# Patient Record
Sex: MALE | Race: OTHER | HISPANIC OR LATINO | Employment: FULL TIME | URBAN - METROPOLITAN AREA
[De-identification: names, ages, dates, MRNs, and addresses within clinical notes are randomized per-mention and may not be internally consistent; named-entity substitution may affect disease eponyms.]

---

## 2019-01-06 ENCOUNTER — HOSPITAL ENCOUNTER (EMERGENCY)
Facility: HOSPITAL | Age: 24
Discharge: HOME | End: 2019-01-06
Attending: EMERGENCY MEDICINE | Admitting: EMERGENCY MEDICINE

## 2019-01-06 VITALS
HEART RATE: 110 BPM | RESPIRATION RATE: 18 BRPM | WEIGHT: 213 LBS | OXYGEN SATURATION: 98 % | SYSTOLIC BLOOD PRESSURE: 140 MMHG | TEMPERATURE: 99.5 F | DIASTOLIC BLOOD PRESSURE: 90 MMHG

## 2019-01-06 DIAGNOSIS — K08.89 TOOTHACHE: Primary | ICD-10-CM

## 2019-01-06 PROCEDURE — 99282 EMERGENCY DEPT VISIT SF MDM: CPT

## 2019-01-06 RX ORDER — IBUPROFEN 600 MG/1
600 TABLET ORAL ONCE
Status: COMPLETED | OUTPATIENT
Start: 2019-01-06 | End: 2019-01-06

## 2019-01-06 RX ORDER — CLINDAMYCIN HYDROCHLORIDE 150 MG/1
300 CAPSULE ORAL EVERY 6 HOURS SCHEDULED
Qty: 80 CAPSULE | Refills: 0 | Status: SHIPPED | OUTPATIENT
Start: 2019-01-06 | End: 2019-01-16

## 2019-01-06 RX ORDER — IBUPROFEN 600 MG/1
600 TABLET ORAL EVERY 6 HOURS PRN
Qty: 30 TABLET | Refills: 0 | Status: SHIPPED | OUTPATIENT
Start: 2019-01-06

## 2019-01-06 RX ORDER — CLINDAMYCIN HYDROCHLORIDE 150 MG/1
300 CAPSULE ORAL ONCE
Status: COMPLETED | OUTPATIENT
Start: 2019-01-06 | End: 2019-01-06

## 2019-01-06 RX ADMIN — IBUPROFEN 600 MG: 600 TABLET ORAL at 20:31

## 2019-01-06 RX ADMIN — CLINDAMYCIN HYDROCHLORIDE 300 MG: 150 CAPSULE ORAL at 20:31

## 2019-01-07 NOTE — DISCHARGE INSTRUCTIONS
Dolor de [employer]   LO QUE NECESITA SABER:   Un dolor de SMITH'S GREEN que es causado por zoran inflamación del nervio en el centro del diente  La irritación puede ser causada por varios problemas, mainor por caries, zoran infección, un diente vencido o enfermedad de las encías  Es muy importante que acuda a ozran yasmine de control con flores odontólogo para que le puedan diagnosticar la causa de flores dolor de SMITH'S GREEN y recibir tratamiento  Lo cual puede ayudar a prevenir problemas serios  [facility]:   Medicamentos:  Es posible que usted necesite alguno de los siguientes:  · AINEs (Analgésicos antiinflamatorios no esteroides)  disminuyen la inflamación y el dolor  Sheree medicamento se puede comprar con o sin receta médica  Sheree medicamento puede causar sangrado estomacal o problemas en los riñones en ciertas personas  Si usted eber un medicamento anticoagulante, asegúrese de preguntarle a flores médico si los VIDA son seguros para usted  Ayesha siempre la etiqueta y siga cuidadosamente las instrucciones antes de usar sheree medicamento  · El acetaminofén  [employer]  Está disponible sin receta médica  Pregunte la cantidad y la frecuencia con que debe tomarlos  [id number]  El acetaminofén puede causar daño en el [employer] cuando no se eber de forma correcta  · Los analgésicos  flores forma de presentación puede ser en píldora o mainor un medicamento que se aplica directamente en el diente o las encías  No espere hasta que el dolor sea severo antes de bebeto sheree medicamento  · Antibióticos  contribuyen a combatir o evitar que el tomer contraiga zoran infección causada por zoran bacteria  Tómelos tony [employer]  · [place] mylene medicamentos mainor se le haya indicado  Consulte con flores médico si usted sarahi que flores medicamento no le está ayudando o si presenta efectos secundarios  Infórmele si es alérgico a algún medicamento   Mantenga zoran lista actualizada de los [place], las vitaminas y los productos herbales que eber  Incluya los siguientes datos de los medicamentos: cantidad, frecuencia y motivo de administración  Traiga con usted la lista o los envases de la píldoras a mylene citas de seguimiento  Lleve la lista de los medicamentos con usted en genesis de zoran emergencia  Programe zoran yasmine de seguimiento con quinones dentista tony mainor se le indica:  Es posible que lo refieran a un odontólogo cirujano  Anote mylene preguntas para que se acuerde de hacerlas duncan mylene visitas  Cuidados personales:   · Enjuague la boca con agua tibia con sal 4 veces al día o según las indicaciones  · Es posible que necesite comer alimentos blandos para aliviar el dolor que le causa masticar  Comuníquese con quinones dentista si:   · Usted tiene preguntas o inquietudes acerca de quinones condición o cuidado  Regrese a la cami de emergencias si:   · Usted tiene dificultad para respirar  · Usted tiene quinones praneeth o jennifer inflamados  · Usted tiene fiebre o escalofríos  · Usted tiene dificultad para hablar o tragar  · Usted tiene dificultad para abrir o cerrar la boca  © 2017 [address] Information is for End User's use only and may not be sold, redistributed or otherwise used for commercial purposes  All illustrations and images included in CareNotes® are the copyrighted property of A D A M , Inc  or Bryan Hassan  Esta información es sólo para uso en educación  Quinones intención no es darle un consejo médico sobre enfermedades o tratamientos  Colsulte con quinones [employer] farmacéutico antes de seguir cualquier régimen médico para saber si es seguro y efectivo para usted

## 2019-01-07 NOTE — ED PROVIDER NOTES
History  Chief Complaint   Patient presents with    Dental Pain     Pin in gums on right side that started a week ago and it got stronger  Denies taking any medicaitons  History provided by:  Patient   used: No    Medical Problem   Location:  Pt with tooth pain for 3 days   Severity:  Mild  Onset quality:  Gradual  Duration:  3 days  Timing:  Constant  Progression:  Unchanged  Chronicity:  New  Associated symptoms: no abdominal pain, no chest pain, no congestion, no cough, no diarrhea, no ear pain, no fatigue, no fever, no headaches, no loss of consciousness, no myalgias, no nausea, no rash, no rhinorrhea, no shortness of breath, no sore throat, no vomiting and no wheezing        None       History reviewed  No pertinent past medical history  History reviewed  No pertinent surgical history  History reviewed  No pertinent family history  I have reviewed and agree with the history as documented  Social History   Substance Use Topics    Smoking status: Never Smoker    Smokeless tobacco: Never Used    Alcohol use Yes      Comment: sometimes        Review of Systems   Constitutional: Negative  Negative for fatigue and fever  HENT: Positive for dental problem  Negative for congestion, ear pain, rhinorrhea and sore throat  Eyes: Negative  Respiratory: Negative  Negative for cough, shortness of breath and wheezing  Cardiovascular: Negative  Negative for chest pain  Gastrointestinal: Negative  Negative for abdominal pain, diarrhea, nausea and vomiting  Endocrine: Negative  Genitourinary: Negative  Musculoskeletal: Negative  Negative for myalgias  Skin: Negative for rash  Allergic/Immunologic: Negative  Neurological: Negative  Negative for loss of consciousness and headaches  Hematological: Negative  Psychiatric/Behavioral: Negative  All other systems reviewed and are negative        Physical Exam  Physical Exam   Constitutional: He is oriented to person, place, and time  He appears well-developed and well-nourished  HENT:   Head: Normocephalic and atraumatic  Right Ear: External ear normal    Left Ear: External ear normal    Nose: Nose normal    Mouth/Throat: Oropharynx is clear and moist    Right upper 2nd molar decay and pain    Eyes: Pupils are equal, round, and reactive to light  Conjunctivae and EOM are normal    Neck: Normal range of motion  Neck supple  Cardiovascular: Normal rate, regular rhythm and normal heart sounds  Pulmonary/Chest: Effort normal and breath sounds normal    Abdominal: Soft  Bowel sounds are normal    Musculoskeletal: Normal range of motion  Neurological: He is alert and oriented to person, place, and time  Skin: Skin is warm and dry  Psychiatric: He has a normal mood and affect  His behavior is normal    Nursing note and vitals reviewed  Vital Signs  ED Triage Vitals [01/06/19 2007]   Temperature Pulse Respirations Blood Pressure SpO2   99 5 °F (37 5 °C) (!) 110 18 140/90 98 %      Temp Source Heart Rate Source Patient Position - Orthostatic VS BP Location FiO2 (%)   Tympanic Monitor Sitting Left arm --      Pain Score       Worst Possible Pain           Vitals:    01/06/19 2007   BP: 140/90   Pulse: (!) 110   Patient Position - Orthostatic VS: Sitting       Visual Acuity      ED Medications  Medications   clindamycin (CLEOCIN) capsule 300 mg (300 mg Oral Given 1/6/19 2031)   ibuprofen (MOTRIN) tablet 600 mg (600 mg Oral Given 1/6/19 2031)       Diagnostic Studies  Results Reviewed     None                 No orders to display              Procedures  Procedures       Phone Contacts  ED Phone Contact    ED Course                               MDM  CritCare Time    Disposition  Final diagnoses:   Toothache     Time reflects when diagnosis was documented in both MDM as applicable and the Disposition within this note     Time User Action Codes Description Comment    1/6/2019  8:23 PM Elzbieta Guan   Add [X98 73] Toothache       ED Disposition     ED Disposition Condition Comment    Discharge  [address] discharge to home/self care  Condition at discharge: Good        Follow-up Information     Follow up With Specialties Details Why [address]  Schedule an appointment as soon as possible for a visit  [address] [place] [address]  [phone]          Discharge Medication List as of 1/6/2019  8:25 PM      START taking these medications    Details   ibuprofen (MOTRIN) 600 mg tablet Take 1 tablet (600 mg total) by mouth every 6 (six) hours as needed (pain), Starting Sun 1/6/2019, Print           No discharge procedures on file      ED Provider  Electronically Signed by           Kassidy Gonzalez PA-C  01/07/19 0126

## 2020-04-07 ENCOUNTER — HOSPITAL ENCOUNTER (EMERGENCY)
Facility: HOSPITAL | Age: 25
Discharge: HOME | End: 2020-04-07
Attending: EMERGENCY MEDICINE | Admitting: EMERGENCY MEDICINE
Payer: COMMERCIAL

## 2020-04-07 ENCOUNTER — APPOINTMENT (EMERGENCY)
Dept: RADIOLOGY | Facility: HOSPITAL | Age: 25
End: 2020-04-07
Payer: COMMERCIAL

## 2020-04-07 VITALS
OXYGEN SATURATION: 98 % | RESPIRATION RATE: 20 BRPM | DIASTOLIC BLOOD PRESSURE: 92 MMHG | WEIGHT: 214.51 LBS | HEART RATE: 115 BPM | TEMPERATURE: 99.8 F | SYSTOLIC BLOOD PRESSURE: 140 MMHG

## 2020-04-07 DIAGNOSIS — R05.9 COUGH: Primary | ICD-10-CM

## 2020-04-07 DIAGNOSIS — Z20.822 SUSPECTED COVID-19 VIRUS INFECTION: ICD-10-CM

## 2020-04-07 PROCEDURE — 71045 X-RAY EXAM CHEST 1 VIEW: CPT

## 2020-04-07 PROCEDURE — 99284 EMERGENCY DEPT VISIT MOD MDM: CPT

## 2020-04-07 PROCEDURE — 87635 SARS-COV-2 COVID-19 AMP PRB: CPT | Performed by: EMERGENCY MEDICINE

## 2020-04-07 PROCEDURE — 99284 EMERGENCY DEPT VISIT MOD MDM: CPT | Performed by: EMERGENCY MEDICINE

## 2020-04-07 RX ORDER — ACETAMINOPHEN 325 MG/1
650 TABLET ORAL ONCE
Status: COMPLETED | OUTPATIENT
Start: 2020-04-07 | End: 2020-04-07

## 2020-04-07 RX ADMIN — ACETAMINOPHEN 650 MG: 325 TABLET ORAL at 12:35

## 2020-04-08 ENCOUNTER — TELEPHONE (OUTPATIENT)
Dept: EMERGENCY DEPT | Facility: HOSPITAL | Age: 25
End: 2020-04-08

## 2020-04-08 LAB — SARS-COV-2 RNA SPEC QL NAA+PROBE: DETECTED

## 2021-05-18 ENCOUNTER — APPOINTMENT (OUTPATIENT)
Dept: RADIOLOGY | Age: 26
End: 2021-05-18
Attending: NURSE PRACTITIONER
Payer: OTHER MISCELLANEOUS

## 2021-05-18 ENCOUNTER — OCCMED (OUTPATIENT)
Dept: URGENT CARE | Age: 26
End: 2021-05-18
Payer: OTHER MISCELLANEOUS

## 2021-05-18 DIAGNOSIS — T14.90XA INJURY: ICD-10-CM

## 2021-05-18 DIAGNOSIS — T14.90XA INJURY: Primary | ICD-10-CM

## 2021-05-18 PROCEDURE — 73110 X-RAY EXAM OF WRIST: CPT

## 2021-05-18 PROCEDURE — 99284 EMERGENCY DEPT VISIT MOD MDM: CPT | Performed by: NURSE PRACTITIONER

## 2021-05-18 PROCEDURE — G0383 LEV 4 HOSP TYPE B ED VISIT: HCPCS | Performed by: NURSE PRACTITIONER

## 2021-05-21 ENCOUNTER — APPOINTMENT (OUTPATIENT)
Dept: URGENT CARE | Age: 26
End: 2021-05-21
Payer: OTHER MISCELLANEOUS

## 2021-05-21 PROCEDURE — 99213 OFFICE O/P EST LOW 20 MIN: CPT | Performed by: PHYSICIAN ASSISTANT

## 2021-05-28 ENCOUNTER — APPOINTMENT (OUTPATIENT)
Dept: URGENT CARE | Age: 26
End: 2021-05-28
Payer: OTHER MISCELLANEOUS

## 2021-05-28 PROCEDURE — 99213 OFFICE O/P EST LOW 20 MIN: CPT | Performed by: PHYSICIAN ASSISTANT

## 2021-06-07 ENCOUNTER — EVALUATION (OUTPATIENT)
Dept: OCCUPATIONAL THERAPY | Age: 26
End: 2021-06-07
Payer: OTHER MISCELLANEOUS

## 2021-06-07 DIAGNOSIS — S63.501D SPRAIN OF WRIST, RIGHT, SUBSEQUENT ENCOUNTER: Primary | ICD-10-CM

## 2021-06-07 PROCEDURE — 97140 MANUAL THERAPY 1/> REGIONS: CPT

## 2021-06-07 PROCEDURE — 97165 OT EVAL LOW COMPLEX 30 MIN: CPT

## 2021-06-07 PROCEDURE — 97535 SELF CARE MNGMENT TRAINING: CPT

## 2021-06-07 NOTE — PROGRESS NOTES
OT Evaluation     Today's date: 2021  Patient name: Zander Joseph  : 1995  MRN: [id number]  Referring provider: Symone Guevara MD  Dx:   Encounter Diagnosis     ICD-10-CM    1  Sprain of wrist, right, subsequent encounter  S63 501D                   Assessment  Assessment details: Mary Ann [id number]; used for evaluation  Pt is a 29 y/o male who reports injuring his R hand/forearm at work when a heavy crate of water fell on him and his wrist bent backwards on 21  Pt x-rays negative for fracture, but pt still reporting pain and swelling in R wrist and forearm  Pt referred to workmens comp MD who referred pt to skilled OT  Pt presented with OTC wrist brace support on R hand which he reports wearing majority of the time since 21 or else he feels pain  Pt demo mild edema, inflammation and tenderness to 2nd DC, ECR L and extensors at distal forearm  Therapist educated pt on edema mgmt of use of KT and compression sleeve, as well as continued icing to reduce edema  Therapist educated pt that he may benefit from better wrist cock up splint to completely immobilize hand, as the one he has allows some movement  Pt paperwork reports he is on limited duty but pt reports he is doing the same sorts of work and machinery use that causes pain, which may be a reason contributing to continued issues  Therapist edu pt to speak with MD about work note on limitations/restrictions  Pt would benefit from continued skilled OT services to provide pt with support his wrist needs, edema and anti-inflammtory techniques in therapy, dec pain, inc ROM, and inc pt ability to return to work full duty without pain     Impairments: abnormal coordination, abnormal or restricted ROM, activity intolerance, impaired physical strength, lacks appropriate home exercise program and pain with function  Other impairment: dec wrist extension, moderate pain while working, mild edema  Functional limitations: unable to complete full work load without pain  Symptom irritability: lowBarriers to therapy: Icelandic speaking  Understanding of Dx/Px/POC: good   Prognosis: good    Goals  STGs:  1  Pt will inc wrist ext + 5 degrees  2  Pt will demo dec edema to wrist by - 3 cm  3  Pt will report dec pain to wrist by 50%  LTGs:  1  Pt will inc wrist AROM to WFLs  2  Pt wi;; inc R  strength to WFLs  3  Pt will be independent with work tasks; return to full duty  4  Pt will demo no edema to R wrist/forearm  Plan  Patient would benefit from: custom splinting and skilled occupational therapy  Planned modality interventions: contrast bath immersion, cryotherapy, thermotherapy: hydrocollator packs and ultrasound  Other planned modality interventions: [facility]  Planned therapy interventions: manual therapy, massage, compression, strengthening, stretching, therapeutic activities, therapeutic exercise, functional ROM exercises and home exercise program  Frequency: 2x week  Duration in weeks: 4  Plan of Care beginning date: 2021  Plan of Care expiration date: 2021  Treatment plan discussed with: patient        Subjective Evaluation    History of Present Illness  Date of onset: 2021  Mechanism of injury: trauma  Mechanism of injury: Pt is a 29 y/o male who reports injuring his R hand/forearm at work when a heavy crate of water fell on him and his wrist bent backwards on 21  Pt x-rays negative for fracture, but pt still reporting pain and swelling in R wrist and forearm             Not a recurrent problem   Quality of life: poor    Pain  Current pain ratin  At best pain ratin  At worst pain ratin  Location: from hand up to elbow  Quality: throbbing, radiating and cramping  Relieving factors: rest, support and ice  Progression: no change    Hand dominance: right    Treatments  Previous treatment: immobilization  Current treatment: occupational therapy  Patient Goals  Patient goals for therapy: decreased pain, return to work, increased strength and decreased edema          Objective     Observations     Right Wrist/Hand   Positive for edema  Additional Observation Details  Mild edema evident at wrist and hand; with inflammation at distal forearm    Palpation     Right   Tenderness of the extensor carpi radialis brevis, extensor carpi radialis longus, extensor digitorum profundus and flexor digitorum superficialis  Tenderness     Right Wrist/Hand   Tenderness in the second dorsal compartment  Neurological Testing     Sensation     Wrist/Hand     Right   Intact: light touch, pin prick and sharp/dull discrimination    Active Range of Motion     Left Wrist   Normal active range of motion    Right Wrist   Wrist flexion: 55 degrees   Wrist extension: 45 degrees with pain  Radial deviation: 20 degrees   Ulnar deviation: 10 degrees with pain    Additional Active Range of Motion Details  WFLs    Strength/Myotome Testing     Left Wrist/Hand   Wrist extension: 4  Wrist flexion: 4  Radial deviation: 4  Ulnar deviation: 4    Right Wrist/Hand   Wrist extension: 3  Wrist flexion: 3  Radial deviation: 3  Ulnar deviation: 3    Additional Strength Details  Weakness noted for wrist ext and hand     Tests     Right Elbow   Negative Tinel's sign (cubital tunnel)  Right Wrist/Hand   Negative Phalen's sign and Tinel's sign (medial nerve)       Swelling     Left Wrist/Hand   Circumference wrist: 17 5 cm    Right Wrist/Hand   Circumference wrist: 18 5 cm             Precautions: Universal; 3x week for 3 weeks- modalities as indicated      Manuals             STM             IASTM             Edema mgmt compression sleeve and KT                         Neuro Re-Ed                                                                                                        Ther Ex             Wrist PREs             Wrist strengthening             forearm strengthening              strength                                                                 Ther Activity                                       Gait Training                                       Modalities             MP/CP

## 2021-06-10 ENCOUNTER — APPOINTMENT (OUTPATIENT)
Dept: URGENT CARE | Age: 26
End: 2021-06-10
Payer: OTHER MISCELLANEOUS

## 2021-06-10 ENCOUNTER — OFFICE VISIT (OUTPATIENT)
Dept: OCCUPATIONAL THERAPY | Age: 26
End: 2021-06-10
Payer: OTHER MISCELLANEOUS

## 2021-06-10 DIAGNOSIS — S63.501D SPRAIN OF WRIST, RIGHT, SUBSEQUENT ENCOUNTER: Primary | ICD-10-CM

## 2021-06-10 PROCEDURE — 99213 OFFICE O/P EST LOW 20 MIN: CPT | Performed by: PREVENTIVE MEDICINE

## 2021-06-10 PROCEDURE — 97760 ORTHOTIC MGMT&TRAING 1ST ENC: CPT

## 2021-06-10 PROCEDURE — 97035 APP MDLTY 1+ULTRASOUND EA 15: CPT

## 2021-06-10 PROCEDURE — 97140 MANUAL THERAPY 1/> REGIONS: CPT

## 2021-06-10 NOTE — PROGRESS NOTES
Daily Note     Today's date: 6/10/2021  Patient name: Evens Lopez  : 1995  MRN: [id number]  Referring provider: Alfonso Leon MD  Dx:   Encounter Diagnosis     ICD-10-CM    1  Sprain of wrist, right, subsequent encounter  S69 327H                 Cryacome  used  Subjective: "I still get pain, when I am working and trying to grab or lift items "      Objective: See treatment diary below      Assessment: Tolerated treatment well  Therapist focused tx on swelling/inflammation of dorsal wrist with use of U/S and STM  Pt reported pain with passive stretch and resistance in extension and radial and ulnar deviation  Therapist fabricated wrist cock up splint to provide more support and less mobility to give pt a rest  Pt has MD appt this afternoon  Patient demonstrated fatigue post treatment and would benefit from continued OT      Plan: Continue per plan of care        Precautions: Universal; 3x week for 3 weeks- modalities as indicated      Manuals  6/10           STM  5'           IASTM             Edema mgmt compression sleeve and KT            splitn klaudia  Ortho fit perf           Neuro Re-Ed                                                                                                        Ther Ex             Wrist PREs  10x           Wrist strengthening             forearm strengthening              strength                                                                 Ther Activity                                       Gait Training                                       Modalities             MP/CP             U/S  8'

## 2021-06-17 ENCOUNTER — OFFICE VISIT (OUTPATIENT)
Dept: OCCUPATIONAL THERAPY | Age: 26
End: 2021-06-17
Payer: OTHER MISCELLANEOUS

## 2021-06-17 DIAGNOSIS — S63.501D SPRAIN OF WRIST, RIGHT, SUBSEQUENT ENCOUNTER: Primary | ICD-10-CM

## 2021-06-17 PROCEDURE — 97140 MANUAL THERAPY 1/> REGIONS: CPT

## 2021-06-17 PROCEDURE — 97035 APP MDLTY 1+ULTRASOUND EA 15: CPT

## 2021-06-17 PROCEDURE — 97110 THERAPEUTIC EXERCISES: CPT

## 2021-06-17 NOTE — PROGRESS NOTES
Daily Note     Today's date: 2021  Patient name: Rubens Torres  : 1995  MRN: [id number]  Referring provider: Renetta Bey MD  Dx:   Encounter Diagnosis     ICD-10-CM    1  Sprain of wrist, right, subsequent encounter  S60 501D                   Subjective: 'My pain is less now that I have some more work restrictions and the hard brace "      Objective: See treatment diary below      Assessment: Tolerated treatment well  Pt demo mild edema/tenosynovitis at dorsum of distal wrist  Therapist utilized U/S and edema massage to dec swelling and inflammation  Pt educated on self stretched, instructed to not over do it, stick to 10x as pt reported inc pain the more he did  Pt also edu and provided HEP for isometric contractions to start strengthening, to tolerance, not to go past point of pain  Pt still instructed rest, ice, and tubi  for edema mgmt  Patient demonstrated fatigue post treatment and would benefit from continued OT      Plan: Continue per plan of care        Precautions: Universal; 3x week for 3 weeks- modalities as indicated      Manuals  6/10 6/17          STM  5' 5' edu to perf at home          IASTM             Edema mgmt compression sleeve and KT  3'          splitn klaudia  Ortho fit perf           Neuro Re-Ed                                                                                                        Ther Ex             Wrist PREs  10x Flex/ext stretch 5-10x hold 10 sec; prayer stretch          Wrist strengthening   isometric strengthening flex/ex and UD/RD 15 sec hold 5x          forearm strengthening              strength                                                                 Ther Activity                                       Gait Training                                       Modalities             MP/CP             U/S  8' 8'

## 2021-06-21 ENCOUNTER — OFFICE VISIT (OUTPATIENT)
Dept: OCCUPATIONAL THERAPY | Age: 26
End: 2021-06-21
Payer: OTHER MISCELLANEOUS

## 2021-06-21 DIAGNOSIS — S63.501D SPRAIN OF WRIST, RIGHT, SUBSEQUENT ENCOUNTER: Primary | ICD-10-CM

## 2021-06-21 PROCEDURE — 97035 APP MDLTY 1+ULTRASOUND EA 15: CPT

## 2021-06-21 PROCEDURE — 97140 MANUAL THERAPY 1/> REGIONS: CPT

## 2021-06-21 PROCEDURE — 97110 THERAPEUTIC EXERCISES: CPT

## 2021-06-21 NOTE — PROGRESS NOTES
Daily Note     Today's date: 2021  Patient name: Micaela López  : 1995  MRN: [id number]  Referring provider: Olvin Ya MD  Dx:   Encounter Diagnosis     ICD-10-CM    1  Sprain of wrist, right, subsequent encounter  S63 501D                   Subjective: "I am feeling better, work is going well with the limitations, the exercises I did over the weekend were fine "      Objective: See treatment diary below      Assessment: Tolerated treatment well  Pt demo mild/mod edema to distal doral forearm  Therapist utilized U/S and STM to dec edema and dec tenosynovitis  Pt reported mild tingling and hands after IASTM use  Pt reported no pain with flexor and extensor stretches  Pt demo G carryover of isometric contraction exercises, without going past point of pain  Therapist applied KT to dec edema  After tx, his swelling noted dec  Patient exhibited good technique with therapeutic exercises and would benefit from continued OT      Plan: Continue per plan of care    dec pain, dec swelling/inflammation     Precautions: Universal; 3x week for 3 weeks- modalities as indicated      Manuals  6/10 6/17 6/21         STM  5' 5' edu to perf at home 5'         IASTM    5'         Edema mgmt compression sleeve and KT  3' compresison and KT         splitn klaudia  Ortho fit perf           Neuro Re-Ed                                                                                                        Ther Ex             Wrist PREs  10x Flex/ext stretch 5-10x hold 10 sec; prayer stretch Flex/ext stretch 5-10x hold 10 sec; prayer stretch         Wrist strengthening   isometric strengthening flex/ex and UD/RD 15 sec hold 5x isometric strengthening flex/ex and UD/RD 5 sec hold 5x            forearm strengthening              strength                                                                 Ther Activity                                       Gait Training                                       Modalities MP/CP             U/S  8' 8' 8'

## 2021-06-22 ENCOUNTER — APPOINTMENT (OUTPATIENT)
Dept: URGENT CARE | Age: 26
End: 2021-06-22
Payer: OTHER MISCELLANEOUS

## 2021-06-22 PROCEDURE — 99213 OFFICE O/P EST LOW 20 MIN: CPT | Performed by: PREVENTIVE MEDICINE

## 2021-06-24 ENCOUNTER — OFFICE VISIT (OUTPATIENT)
Dept: OCCUPATIONAL THERAPY | Age: 26
End: 2021-06-24
Payer: OTHER MISCELLANEOUS

## 2021-06-24 DIAGNOSIS — S63.501D SPRAIN OF WRIST, RIGHT, SUBSEQUENT ENCOUNTER: Primary | ICD-10-CM

## 2021-06-24 PROCEDURE — 97140 MANUAL THERAPY 1/> REGIONS: CPT

## 2021-06-24 PROCEDURE — 97110 THERAPEUTIC EXERCISES: CPT

## 2021-06-24 NOTE — PROGRESS NOTES
Daily Note     Today's date: 2021  Patient name: Sherin Barfield  : 1995  MRN: [id number]  Referring provider: Katiana Stubbs MD  Dx:   Encounter Diagnosis     ICD-10-CM    1  Sprain of wrist, right, subsequent encounter  S65 501D                   Subjective: "I only have pain when I am using it a lot or pushing/pulling things  I noticed my arm has gone numb when I have been driving "      Objective: See treatment diary below      Assessment: Tolerated treatment well  Pt noted with mild/mod tenderness and edema at distal forearm/ tenosynivitis  Therapist perf STM and edu pt on performing at home  Therapist noticed pt OTC wrist brace too small and putting extra pressure on the exact spot of injury  Pt instructed to bring in brace that was made as he says it feels a little big as well as bigger OTC brace for him to wear at work  Patient exhibited good technique with therapeutic exercises and would benefit from continued OT      Plan: Continue per plan of care        Precautions: Universal; 3x week for 3 weeks- modalities as indicated      Manuals  6/10 6/17 6/21  6/24       STM  5' 5' edu to perf at home 5'  5'       IASTM    5'  5'       Edema mgmt compression sleeve and KT  3' compresison and KT  KT       splitn klaudia  Ortho fit perf           Neuro Re-Ed                                                                                                        Ther Ex             Wrist PREs  10x Flex/ext stretch 5-10x hold 10 sec; prayer stretch Flex/ext stretch 5-10x hold 10 sec; prayer stretch  wrist maze, f/e stretch, flex bar       Wrist strengthening   isometric strengthening flex/ex and UD/RD 15 sec hold 5x isometric strengthening flex/ex and UD/RD 5 sec hold 5x     eccentrix 2#       forearm strengthening      YH 2x10        strength      Red power web                                                           Ther Activity                                       Gait Training Modalities             MP/CP             U/S  8' 8' 8'

## 2021-06-29 ENCOUNTER — APPOINTMENT (OUTPATIENT)
Dept: URGENT CARE | Age: 26
End: 2021-06-29
Payer: OTHER MISCELLANEOUS

## 2021-06-29 PROCEDURE — 99213 OFFICE O/P EST LOW 20 MIN: CPT | Performed by: PREVENTIVE MEDICINE

## 2025-04-15 ENCOUNTER — OFFICE VISIT (OUTPATIENT)
Dept: FAMILY MEDICINE CLINIC | Facility: CLINIC | Age: 30
End: 2025-04-15
Payer: COMMERCIAL

## 2025-04-15 VITALS
RESPIRATION RATE: 16 BRPM | TEMPERATURE: 98.3 F | OXYGEN SATURATION: 98 % | BODY MASS INDEX: 27.99 KG/M2 | HEIGHT: 69 IN | SYSTOLIC BLOOD PRESSURE: 112 MMHG | HEART RATE: 86 BPM | WEIGHT: 189 LBS | DIASTOLIC BLOOD PRESSURE: 70 MMHG

## 2025-04-15 DIAGNOSIS — M54.50 CHRONIC LEFT-SIDED LOW BACK PAIN WITHOUT SCIATICA: ICD-10-CM

## 2025-04-15 DIAGNOSIS — Z13.6 SCREENING FOR CARDIOVASCULAR CONDITION: ICD-10-CM

## 2025-04-15 DIAGNOSIS — Z11.4 SCREENING FOR HIV (HUMAN IMMUNODEFICIENCY VIRUS): ICD-10-CM

## 2025-04-15 DIAGNOSIS — E66.3 OVERWEIGHT (BMI 25.0-29.9): ICD-10-CM

## 2025-04-15 DIAGNOSIS — G89.29 CHRONIC LEFT-SIDED LOW BACK PAIN WITHOUT SCIATICA: ICD-10-CM

## 2025-04-15 DIAGNOSIS — R35.0 URINARY FREQUENCY: ICD-10-CM

## 2025-04-15 DIAGNOSIS — Z11.59 NEED FOR HEPATITIS C SCREENING TEST: ICD-10-CM

## 2025-04-15 DIAGNOSIS — Z00.00 ANNUAL PHYSICAL EXAM: ICD-10-CM

## 2025-04-15 DIAGNOSIS — Z76.89 ENCOUNTER TO ESTABLISH CARE: Primary | ICD-10-CM

## 2025-04-15 PROCEDURE — 99385 PREV VISIT NEW AGE 18-39: CPT | Performed by: PHYSICIAN ASSISTANT

## 2025-04-15 PROCEDURE — 99204 OFFICE O/P NEW MOD 45 MIN: CPT | Performed by: PHYSICIAN ASSISTANT

## 2025-04-15 RX ORDER — ACETAMINOPHEN 325 MG/1
650 TABLET ORAL EVERY 6 HOURS PRN
COMMUNITY
End: 2025-04-15 | Stop reason: ALTCHOICE

## 2025-04-15 NOTE — PROGRESS NOTES
Adult Annual Physical  Name: Jemal Diaz      : 1995      MRN: [id number]  Encounter Provider: Jane Lynn PA-C  Encounter Date: 4/15/2025   Encounter department: [facility]    :  Assessment & Plan  Encounter to establish care  No previous PCP, establish care as a new patient       Annual physical exam         Urinary frequency  Drinks coffee twice a day and soda with some meals. Recommend against caffeine beverages due to recent increased frequency of urination sometimes every 20-30 min and waking up at night, strong urinary stream. Same sexual partner for past 10 years, no concerns for STI. Tries to stay hydrated, gets thirsty after drinking coffee. Check UA and will consider PVR pending response to stopping caffeine.   Orders:  •  UA w Reflex to Microscopic w Reflex to Culture; Future  •  Chlamydia/GC amplified DNA by PCR; Future    Chronic left-sided low back pain without sciatica  Intermittent, no current pain on exam.  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  Intermittent heavy lifting.  Recommend core strengthening and stretching exercises.  Not requiring any medication, continue Tylenol as needed.  Follow-up if no improvement or worsening.       Screening for HIV (human immunodeficiency virus)    Orders:  •  HIV 1/2 AG/AB w Reflex [facility] for 2 yr old and above; Future    Need for hepatitis C screening test    Orders:  •  Hepatitis C antibody; Future    Screening for cardiovascular condition    Orders:  •  Lipid panel; Future  •  Comprehensive metabolic panel; Future  •  CBC and differential; Future    Overweight (BMI 25.0-29.9)  BMI Counseling: Body mass index is 28.32 kg/m². The BMI     Lost about 10 pounds intentionally.  Max weight 200 pounds.  Encouraged to maintain diet and exercise.               Preventive Screenings:  - Diabetes Screening: orders placed  - Cholesterol Screening: orders placed   - Hepatitis C screening: patient agrees to  screening   - HIV screening: patient agrees to screening   - Colon cancer screening: screening not indicated   - Lung cancer screening: screening not indicated   - Prostate cancer screening: screening not indicated     Immunizations:  - Immunizations due: Influenza, Tdap and tdap discussed - not in stock  - Risks/benefits immunizations discussed      Counseling/Anticipatory Guidance:  - Alcohol: discussed moderation in alcohol intake and recommendations for healthy alcohol use.   - Drug use: discussed harms of illicit drug use and how it can negatively impact mental/physical health.   - Tobacco use: discussed harms of tobacco use and management options for quitting.   - Dental health: discussed importance of regular tooth brushing, flossing, and dental visits.   - Sexual health: discussed sexually transmitted diseases, partner selection, use of condoms, avoidance of unintended pregnancy, and contraceptive alternatives.   - Diet: discussed recommendations for a healthy/well-balanced diet.   - Exercise: the importance of regular exercise/physical activity was discussed. Recommend exercise 3-5 times per week for at least 30 minutes.   - Injury prevention: discussed safety/seat belts, safety helmets, smoke detectors, carbon monoxide detectors, and smoking near bedding or upholstery.       Depression Screening and Follow-up Plan: Patient was screened for depression during today's encounter. They screened negative with a PHQ-2 score of 0.          History of Present Illness     Adult Annual Physical:  Patient presents for annual physical. Jemal is a 29 y.o. male who presents as a new patient to establish care.  No previous PCP, concerned due to increased urinary frequency in the past year.  No incontinence, hematuria, slowed stream.  No burning or urgency.  Increased frequency, going at least once every hour, drinking coffee twice a day.  Most days will have soda with his meals.  Former smoker, quit when he was  "hospitalized about 10 years ago for pneumonia.  Alcohol about once weekly or less.    History was conducted in Romansh without the use of ,  was offered to patient and was declined by patient.     Toddler aged daughter present for exam.     Diet and Physical Activity:  - Diet/Nutrition: no special diet.  - Exercise: no formal exercise.    Depression Screening:  - PHQ-2 Score: 0    General Health:  - Sleep: sleeps well.  - Hearing: normal hearing bilateral ears.  - Vision: wears glasses.  - Dental: no dental visits for > 1 year.     Health:  - History of STDs: no.   - Urinary symptoms: urinary frequency.     Advanced Care Planning:  - Has an advanced directive?: no    - Has a durable medical POA?: no    - ACP document given to patient?: no      Review of Systems   Constitutional:  Negative for chills and fever.   HENT:  Negative for ear pain and sore throat.    Eyes:  Negative for pain and visual disturbance.   Respiratory:  Negative for cough and shortness of breath.    Cardiovascular:  Negative for chest pain and palpitations.   Gastrointestinal:  Negative for abdominal pain and vomiting.   Genitourinary:  Positive for frequency. Negative for decreased urine volume, dysuria, flank pain and hematuria.   Musculoskeletal:  Positive for back pain. Negative for arthralgias.   Skin:  Negative for color change and rash.   Neurological:  Negative for seizures and syncope.   All other systems reviewed and are negative.        Objective   /70 (BP Location: Left arm, Patient Position: Sitting, Cuff Size: Large)   Pulse 86   Temp 98.3 °F (36.8 °C) (Temporal)   Resp 16   Ht 5' 8.5\" (1.74 m)   Wt 85.7 kg (189 lb)   SpO2 98%   BMI 28.32 kg/m²     Physical Exam  Vitals and nursing note reviewed.   Constitutional:       General: He is not in acute distress.     Appearance: He is well-developed.   HENT:      Head: Normocephalic and atraumatic.      Right Ear: Tympanic membrane, ear canal " and external ear normal.      Left Ear: Tympanic membrane, ear canal and external ear normal.      Mouth/Throat:      Mouth: Mucous membranes are moist.   Eyes:      Extraocular Movements: Extraocular movements intact.      Conjunctiva/sclera: Conjunctivae normal.      Pupils: Pupils are equal, round, and reactive to light.   Cardiovascular:      Rate and Rhythm: Normal rate and regular rhythm.      Heart sounds: No murmur heard.  Pulmonary:      Effort: Pulmonary effort is normal. No respiratory distress.      Breath sounds: Normal breath sounds.   Abdominal:      Palpations: Abdomen is soft.      Tenderness: There is no abdominal tenderness.   Musculoskeletal:         General: No swelling.      Cervical back: Neck supple.   Skin:     General: Skin is warm and dry.      Capillary Refill: Capillary refill takes less than 2 seconds.   Neurological:      Mental Status: He is alert.   Psychiatric:         Mood and Affect: Mood normal.

## 2025-04-15 NOTE — ASSESSMENT & PLAN NOTE
BMI Counseling: Body mass index is 28.32 kg/m². The BMI     Lost about 10 pounds intentionally.  Max weight 200 pounds.  Encouraged to maintain diet and exercise.

## 2025-05-06 ENCOUNTER — RESULTS FOLLOW-UP (OUTPATIENT)
Dept: FAMILY MEDICINE CLINIC | Facility: CLINIC | Age: 30
End: 2025-05-06

## 2025-05-06 ENCOUNTER — APPOINTMENT (OUTPATIENT)
Dept: LAB | Facility: HOSPITAL | Age: 30
End: 2025-05-06
Payer: COMMERCIAL

## 2025-05-13 ENCOUNTER — OFFICE VISIT (OUTPATIENT)
Dept: FAMILY MEDICINE CLINIC | Facility: CLINIC | Age: 30
End: 2025-05-13
Payer: COMMERCIAL

## 2025-05-13 ENCOUNTER — RESULTS FOLLOW-UP (OUTPATIENT)
Dept: FAMILY MEDICINE CLINIC | Facility: CLINIC | Age: 30
End: 2025-05-13

## 2025-05-13 VITALS
RESPIRATION RATE: 17 BRPM | BODY MASS INDEX: 28.23 KG/M2 | SYSTOLIC BLOOD PRESSURE: 124 MMHG | OXYGEN SATURATION: 98 % | HEIGHT: 69 IN | HEART RATE: 85 BPM | TEMPERATURE: 96.6 F | DIASTOLIC BLOOD PRESSURE: 74 MMHG | WEIGHT: 190.6 LBS

## 2025-05-13 DIAGNOSIS — E66.3 OVERWEIGHT (BMI 25.0-29.9): ICD-10-CM

## 2025-05-13 DIAGNOSIS — E78.1 HYPERTRIGLYCERIDEMIA: ICD-10-CM

## 2025-05-13 DIAGNOSIS — E11.65 TYPE 2 DIABETES MELLITUS WITH HYPERGLYCEMIA, WITHOUT LONG-TERM CURRENT USE OF INSULIN (HCC): Primary | ICD-10-CM

## 2025-05-13 DIAGNOSIS — R81 GLUCOSURIA: ICD-10-CM

## 2025-05-13 LAB
CREAT UR-MCNC: 42 MG/DL
LEFT EYE DIABETIC RETINOPATHY: NORMAL
LEFT EYE IMAGE QUALITY: NORMAL
LEFT EYE MACULAR EDEMA: NORMAL
LEFT EYE OTHER RETINOPATHY: NORMAL
MICROALBUMIN UR-MCNC: <7 MG/L
RIGHT EYE DIABETIC RETINOPATHY: NORMAL
RIGHT EYE IMAGE QUALITY: NORMAL
RIGHT EYE MACULAR EDEMA: NORMAL
RIGHT EYE OTHER RETINOPATHY: NORMAL
SEVERITY (EYE EXAM): NORMAL
SL AMB POCT HEMOGLOBIN AIC: 12.9 (ref ?–6.5)

## 2025-05-13 PROCEDURE — 83036 HEMOGLOBIN GLYCOSYLATED A1C: CPT | Performed by: PHYSICIAN ASSISTANT

## 2025-05-13 PROCEDURE — 82043 UR ALBUMIN QUANTITATIVE: CPT | Performed by: PHYSICIAN ASSISTANT

## 2025-05-13 PROCEDURE — 99214 OFFICE O/P EST MOD 30 MIN: CPT | Performed by: PHYSICIAN ASSISTANT

## 2025-05-13 PROCEDURE — 82570 ASSAY OF URINE CREATININE: CPT | Performed by: PHYSICIAN ASSISTANT

## 2025-05-13 RX ORDER — METFORMIN HYDROCHLORIDE 500 MG/1
500 TABLET, EXTENDED RELEASE ORAL 2 TIMES DAILY WITH MEALS
Qty: 180 TABLET | Refills: 1 | Status: SHIPPED | OUTPATIENT
Start: 2025-05-13

## 2025-05-13 RX ORDER — LANCETS 33 GAUGE
EACH MISCELLANEOUS
Qty: 100 EACH | Refills: 3 | Status: SHIPPED | OUTPATIENT
Start: 2025-05-13

## 2025-05-13 RX ORDER — BLOOD SUGAR DIAGNOSTIC
STRIP MISCELLANEOUS
Qty: 100 EACH | Refills: 3 | Status: SHIPPED | OUTPATIENT
Start: 2025-05-13

## 2025-05-13 RX ORDER — BLOOD-GLUCOSE METER
KIT MISCELLANEOUS
Qty: 1 KIT | Refills: 0 | Status: SHIPPED | OUTPATIENT
Start: 2025-05-13

## 2025-05-13 RX ORDER — ATORVASTATIN CALCIUM 10 MG/1
10 TABLET, FILM COATED ORAL DAILY
Qty: 90 TABLET | Refills: 1 | Status: SHIPPED | OUTPATIENT
Start: 2025-05-13

## 2025-05-13 RX ORDER — GLIMEPIRIDE 2 MG/1
2 TABLET ORAL
Qty: 30 TABLET | Refills: 0 | Status: SHIPPED | OUTPATIENT
Start: 2025-05-13

## 2025-05-13 RX ORDER — ATORVASTATIN CALCIUM 40 MG/1
40 TABLET, FILM COATED ORAL DAILY
Status: CANCELLED | OUTPATIENT
Start: 2025-05-13

## 2025-05-13 NOTE — ASSESSMENT & PLAN NOTE
Lab Results   Component Value Date    CHOLESTEROL 125 05/06/2025     Lab Results   Component Value Date    HDL 33 (L) 05/06/2025     Lab Results   Component Value Date    TRIG 211 (H) 05/06/2025     Lab Results   Component Value Date    NONHDLC 92 05/06/2025     Start atorvastatin 10mg with goal to maintain LDL <70 for diabetes.    Lab Results   Component Value Date    LDLCALC 50 05/06/2025

## 2025-05-13 NOTE — ASSESSMENT & PLAN NOTE
Wt Readings from Last 3 Encounters:   05/13/25 86.5 kg (190 lb 9.6 oz)   04/15/25 85.7 kg (189 lb)   04/07/20 97.3 kg (214 lb 8.1 oz)     Stable weight since last visit. Encouraged well-balanced diet and exercise.

## 2025-05-13 NOTE — ASSESSMENT & PLAN NOTE
Lab Results   Component Value Date    HGBA1C 12.9 (A) 05/13/2025     New diagnosis today, symptoms ongoing for about 2-3 years but never sought medical attention, suspect cause of frequent urination and weight loss. Significant hyperglycemia with fatigue. Demonstrated use of glucometer. Completed IRIS exam and diabetic foot exam, urine sent for annual microalbumin/cr ratio. Discussed risks of hyperglycemia and importance of glucose management. Discussed insulin today, will hold pending response to diet changes and oral medication. Will start GLP-1 with Trulicity once weekly pending insurance, demonstrated use of pen today. Caution with use of sulfonylurea and hypoglycemia. Start metformin 500mg BID and pending tolerance will increase to max dose. Maintain close follow-up if still with sugars >200s for dose adjustments in the next week but otherwise follow-up in 4 weeks for diabetes. Family hx of uncle with diabetes, no other known family members. Stop soda and sweets.    Orders:  •  POCT hemoglobin A1c  •  C-peptide; Future  •  Insulin, fasting; Future  •  metFORMIN (GLUCOPHAGE-XR) 500 mg 24 hr tablet; Take 1 tablet (500 mg total) by mouth 2 (two) times a day with meals Destinee 1 tableta dos veces al blanca con comida  •  glimepiride (AMARYL) 2 mg tablet; Take 1 tablet (2 mg total) by mouth daily with breakfast Destinee 1 tableta cada blanca con desayuno  •  Dulaglutide 0.75 MG/0.5ML SOAJ; Inject 0.75 mg under the skin once a week  •  atorvastatin (LIPITOR) 10 mg tablet; Take 1 tablet (10 mg total) by mouth daily Destinee 1 tableta antes de acostarse  •  Blood Glucose Monitoring Suppl (OneTouch Verio Reflect) w/Device KIT; Check blood sugars once daily. Please substitute with appropriate alternative as covered by patient's insurance. Dx: E11.65  •  glucose blood (OneTouch Verio) test strip; Check blood sugars once daily. Please substitute with appropriate alternative as covered by patient's insurance. Dx: E11.65  •  OneTouch  Delica Lancets 33G MISC; Check blood sugars once daily. Please substitute with appropriate alternative as covered by patient's insurance. Dx: E11.65  •  Albumin / creatinine urine ratio; Future  •  IRIS Diabetic eye exam

## 2025-05-13 NOTE — PATIENT INSTRUCTIONS
Patient Education     Diabetes tipo 2   Conceptos Básicos   Redactado por los médicos y editores de UpToDate   ¿Qué es la diabetes tipo 2? -- La diabetes tipo 2 es un trastorno que afecta la forma en que el cuerpo utiliza el azúcar. A veces se lo llama diabetes mellitus tipo 2.  Todas las células del cuerpo necesitan azúcar para funcionar normalmente. El azúcar entra en las células con la ayuda de zoran hormona llamada insulina. La insulina se produce en el páncreas, un órgano ubicado en el área del estómago. Si no hay suficiente insulina o si el cuerpo nevaeh de responder a la insulina, el azúcar se acumula en la janine. [place] es lo que les sucede a las personas con diabetes.  Existen dos tipos de diabetes:   Diabetes tipo 1 - En la diabetes tipo 1, el páncreas no produce insulina o produce muy poca.   Diabetes tipo 2 - En la mayoría de los casos de diabetes tipo 2, el cuerpo nevaeh de responder a la insulina normalmente. Con el tiempo, el páncreas nevaeh de producir suficiente insulina.  Tener exceso de peso corporal u obesidad aumenta el riesgo de desarrollar diabetes tipo 2. Sin embargo, las personas que no tienen exceso de peso corporal también pueden desarrollar diabetes.  ¿Cuáles son los síntomas de la diabetes tipo 2? -- En general, la diabetes tipo 2 no provoca síntomas. Cuando aparecen síntomas, son los siguientes, entre otros:   Necesidad de orinar con frecuencia   Sed intensa   Visión borrosa  ¿La diabetes puede ocasionar otros problemas de jeovany? -- Sí. Es posible que la diabetes tipo 2 no le cause malestar, mauricio si no la controla, con el tiempo puede ocasionar problemas graves, por ejemplo:   Infartos   Accidentes cerebrovasculares (derrames)   Enfermedad renal   Problemas de visión (o incluso ceguera)   Dolor o pérdida de sensibilidad en las vincent y los pies   Necesidad de cortar (amputar) los dedos de las vincent, los dedos de los pies u otras partes del cuerpo  ¿Cómo puedo saber si tengo diabetes tipo  "2? -- Para averiguar si tiene diabetes tipo 2, el médico o enfermero puede realizar zoran prueba de janine. Existen dos pruebas que pueden usarse con sheree fin. Ambas consisten en medir la cantidad de azúcar en la janine, llamada \"azúcar en janine\" o \"glucosa en janine\":   Zoran de las pruebas mide el azúcar en janine en el momento en que se extrae la muestra. Esta prueba se realiza por la mañana. No podrá comer ni beber nada marky agua duncan un mínimo de 8 horas antes de la prueba.   La otra prueba indica el promedio de azúcar en janine de los últimos 2 a 3 meses. Esta prueba de janine se llama \"hemoglobina A1C\" o simplemente \"A1C\". Se puede revisar en cualquier momento del día, incluso si ha comido recientemente.  ¿Cómo se trata la diabetes tipo 2? -- Las metas del tratamiento son manejar el azúcar en janine y reducir el riesgo de problemas futuros que pueden desarrollar las personas que tienen diabetes.  El tratamiento podría incluir:   Cambios en el estilo de avel - Es un aspecto importante del manejo de la diabetes. Incluye comer alimentos saludables y hacer suficiente actividad física.   Medicinas - Existen algunas medicinas que ayudan a disminuir el azúcar en janine. Algunas personas deben bebeto píldoras que permiten que el organismo genere más insulina o que posibilitan que la insulina cumpla con flores función; otras deben aplicarse inyecciones de insulina.  Según las medicinas que tome, es posible que tenga que revisarse el azúcar en janine periódicamente en flores hogar, mauricio no todas las personas que tienen diabetes tipo 2 necesitan hacerlo. El médico o enfermero le dirá si debe revisarse el azúcar en janine, y cuándo y cómo hacerlo.  A veces, las personas con diabetes tipo 2 también deben bebeto medicinas para ayudar a prevenir los problemas que causa la enfermedad. Por ejemplo, las medicinas que se usan para bajar la presión arterial pueden disminuir las posibilidades de sufrir un infarto o un accidente " cerebrovascular (derrame).   Atención médica general - También es importante cuidar otros aspectos de flores jeovany. [place] incluye vigilar flores presión arterial y mylene niveles de colesterol. También debe recibir ciertas vacunas, mainor las vacunas para protegerse de la gripe y de la enfermedad por coronavirus 2019 (“COVID-19”). Algunas personas también necesitan zoran vacuna para prevenir la neumonía.  ¿La diabetes tipo 2 se puede prevenir? -- Sí. Para reducir mylene posibilidades de desarrollar diabetes tipo 2, lo más importante que puede hacer es tener zoran alimentación saludable y realizar mucha actividad física. [place] puede ayudarlo a bajar de peso, si tiene sobrepeso. Sin embargo, comer hakeem y hacer actividad también es lorenzo para flores jeovany general. Incluso la actividad leve, mainor caminar, tiene beneficios.  Si fuma, dejar de fumar también puede reducir flores riesgo de desarrollar diabetes tipo 2. Dejar de fumar puede ser difícil, mauricio flores médico o enfermero puede ayudar.  Todos los artículos se actualizan a medida que se descubre nueva evidencia y culmina nuestro proceso de evaluación por homólogos   Melissa artículo se recuperó de UpToDate el: Apr 24, 2024.  Artículo 22274 Versión [id number].[id number]  Release: 32.3.2 - C32.113  © 2024 UpToDate, Inc. Todos los derechos reservados.  Exención de responsabilidad y uso de la información del consumidor   Descargo de responsabilidad: esta información generalizada es un resumen limitado de información sobre el diagnóstico, el tratamiento y/o los medicamentos. No pretende ser exhaustiva y se debe utilizar mainor herramienta para ayudar al usuario a comprender y/o evaluar las posibles opciones de diagnóstico y tratamiento. No incluye toda la información sobre afecciones, tratamientos, medicamentos, efectos secundarios o riesgos puedan ser aplicables a un paciente específico. No tiene el propósito de servir mainor recomendación médica ni de sustituir la recomendación médica, el diagnóstico o el  tratamiento de un profesional de atención médica que se base en el examen y la evaluación de sheree profesional de la jeovany respecto a las circunstancias específicas y únicas del paciente. Los pacientes deben hablar con un profesional de atención médica para obtener información completa sobre flores jeovany, cuestiones médicas y opciones de tratamiento, incluidos los riesgos o los beneficios relacionados con el uso de medicamentos. Esta información no certifica que los tratamientos o medicamentos aminah seguros, eficaces o estén aprobados para tratar a un paciente específico. [employer] y mylene afiliados renuncian a cualquier garantía o responsabilidad relacionada con esta información o el uso de la misma.El uso de esta información está sujeto a las Condiciones de uso, disponibles en [email or web address] [email or web address]/en/know/clinical-effectiveness-terms. 2024© [employer] y mylene afiliados y/o licenciantes. Todos los derechos reservados.  Copyright   © 2024 [employer] Todos los derechos reservados.    Patient Education     Conteo de carbohidratos para adultos con diabetes   Conceptos Básicos   Redactado por los médicos y editores de UpToDate   ¿Qué es el conteo de carbohidratos? -- El conteo de carbohidratos es un tipo de planificación de comidas que usan muchas personas con diabetes. Es zoran forma de calcular cuántos carbohidratos consumen.  Nuestro organismo transforma los alimentos que comemos en cata tipos principales de nutrientes: carbohidratos, proteínas y grasas. Los carbohidratos son azúcares y almidones que provienen de los alimentos. El cuerpo usa los carbohidratos mainor teresa de energía.  ¿Por qué markos contar los carbohidratos? -- Las personas que tienen diabetes deben prestar atención a la cantidad de carbohidratos que consumen, ya que los carbohidratos elevan el nivel de azúcar en janine.  El conteo de carbohidratos le ayuda para lo siguiente:   Elegir la cantidad de insulina que usará antes de las comidas y  meriendas - Si usa insulina antes de las comidas, la dosis depende de varias cosas, y zoran de ellas es la cantidad de carbohidratos que planea consumir. (También depende de cuánto ejercicio tenga pensado hacer y de flores nivel de azúcar en janine).   Planificar las comidas y meriendas del día - Puede utilizar el conteo de carbohidratos para calcular cuántos carbohidratos debe consumir en cada comida y merienda. [place] le ayudará a asegurarse de consumir la cantidad correcta todo el día.   Mantener controlado el nivel de azúcar en janine - Distribuir los carbohidratos que consume a lo katie del día puede ayudar a que flores nivel de azúcar en janine no suba demasiado. Si usa insulina u otra medicina para la diabetes que puede causar un nivel bajo de azúcar en janine, consumir aproximadamente la misma cantidad de carbohidratos en cada comida todos los charis también ayuda a impedir que el nivel de azúcar en janine baje demasiado. Reducir la cantidad de carbohidratos que consume puede ayudarle a controlar mejor la diabetes y evitar problemas médicos que esta enfermedad puede producir.  Flores médico, enfermero o nutricionista (experto en alimentos) puede ayudarle a determinar cuántos carbohidratos debe tratar de consumir cada día. La cantidad dependerá de mylene hábitos de alimentación, flores peso, flores nivel de actividad y las medicinas que use para la diabetes.  Las personas que usan insulina antes de las comidas deben poner mucho cuidado al contar los carbohidratos de cada comida y merienda, ya que esto les permite usar la cantidad correcta de insulina. Si la dosis de insulina no es adecuada para la cantidad de carbohidratos, flores nivel de azúcar en janine podría bajar demasiado. Otras personas podrían ser un poco más flexibles, siempre y cuando consuman aproximadamente la misma cantidad de carbohidratos por día.  ¿Qué alimentos tienen carbohidratos? -- Los alimentos con muchos carbohidratos incluyen:   Granos - Entre ellos se cuentan el  "gongora, las pastas, el arroz y los cereales.   Frutas y vegetales con almidón - Algunos vegetales con almidón son la papa, el maíz y la calabaza.   Leche y otros productos lácteos - Entre los productos lácteos se encuentran el queso y el yogur.   Alimentos con azúcar agregada - Entre ellos se cuentan los dulces y los alimentos horneados (mainor las galletas y las tortas), y también las bebidas azucaradas mainor los jugos y las gaseosas.  Lo mejor es que la mayor parte de los carbohidratos provengan de frutas, verduras, granos integrales (mainor el pan, los cereales y el arroz integrales), y de leche y productos lácteos bajos en grasa.  ¿Cómo se cuentan los carbohidratos? -- Para contar los carbohidratos de los alimentos envasados, debe revisar los datos nutricionales en las etiquetas (si tienen etiqueta).  En la etiqueta (figura 1), revise la siguiente información:   Cantidad de “carbohidratos totales” - Indica cuántos carbohidratos contiene zoran porción del alimento. Si come zoran porción, entonces la cantidad de carbohidratos que come es la misma cantidad que los carbohidratos totales.   “Tamaño de la porción” - Indica la cantidad de alimento en zoran porción. Si come 2 porciones, la cantidad de carbohidratos será dos veces la cantidad de carbohidratos mencionada.   “Fibra dietaria” - La fibra es un carbohidrato que no se digiere, esto significa que no eleva el azúcar en janine. Los alimentos con mucha fibra pueden ayudar a controlar el azúcar en janine. Si un alimento tiene más de 5 gramos (g) de fibra, necesita menos insulina si consume mare alimento. Por eso, para calcular zoran dosis de insulina, solo debe contar los carbohidratos que no provengan de la fibra (figura 1).  ¿Qué es el reemplazo de carbohidratos? -- Reemplazar los carbohidratos o \"sistema de reemplazos\", es zoran manera de planificar las comidas sin leer las etiquetas. [place] puede ser útil, ya que muchos alimentos no vienen con zoran etiqueta de datos " "nutricionales.  El sistema de reemplazos consiste en saber qué cantidad de distintos alimentos contiene aproximadamente 15 gramos de carbohidratos (tabla 1 y tabla 2 y tabla 3). Flores médico, enfermero o nutricionista le da zoran cierta cantidad de opciones de carbohidratos para consumir en cada comida y merienda (tabla 4). Cada opción es zoran porción de comida que contiene alrededor de 15 gramos de carbohidratos. [place] mylene opciones hará que le resulte más fácil reemplazar zoran opción de carbohidrato por otra a la hora de planear mylene comidas y meriendas. Por ejemplo, podría reemplazar zoran manzana jose por 1/3 de taza de pasta.  ¿Cómo puedo planificar mis comidas? -- [place], asegúrese de saber cuántos carbohidratos debe consumir por día. Si no está seguro, pregunte a flores médico, enfermero o nutricionista.  Estas sugerencias podrían ser de ayuda:   Distribuya mylene carbohidratos en 4 a 6 comidas pequeñas todos los días, en lugar de 3 grandes   Coma zoran cantidad similar de carbohidratos en cada comida, por ejemplo, en cada alina   Coma mylene comidas a zoran hora similar todos los días   Planifique mylene comidas con tiempo   Use el \"método del plato\", zoran manera más simple de asegurarse de tener un buen equilibrio de carbohidratos y otros nutrientes en cada comida. Por lo general no es tan exacto mainor contar todos los carbohidratos, puede ser útil para quienes tienen dificultades con el conteo. Si usa insulina antes de las comidas, lo mejor es ajustar la dosis de insulina contando cuántos carbohidratos planea consumir en lugar de usar el método del plato.  En el método del plato, comienza con un plato de alrededor de 9 pulgadas (23 cm) de diámetro. Luego, llena (figura 2):   1/2 plato con verduras sin almidón   1/4 de plato con proteína   1/4 de plato con carbohidratos   Siga las instrucciones de flores médico acerca de cómo y cuándo revisarse el nivel de azúcar en janine. [place] puede ayudarle a conocer la manera en que ciertos alimentos " afectan flores azúcar en janine.   Lleve un registro de mylene comidas y los niveles de azúcar en janine. Muéstreselo al médico o enfermero para que pueda ajustarle el plan de tratamiento, si es necesario. Si usa insulina, también tendrá que llevar un registro de mylene rutinas de ejercicio y cuánta insulina recibe con cada dosis.   Si usa insulina, asegúrese de saber cómo utilizarla; por ejemplo, debe saber cómo ajustar la dosis según flores nivel de azúcar en janine y la cantidad de carbohidratos que planea consumir.   Recuerde que otras cosas, además de los carbohidratos, pueden elevar o disminuir el nivel de azúcar en janine, por ejemplo realizar ejercicio físico, enfermarse, beber alcohol, viajar y tener estrés. Si usa insulina, asegúrese de saber cuándo y cómo ajustar la dosis en esas situaciones.  Si tiene dificultad para contar los carbohidratos o mantener flores nivel de azúcar en janine bajo control, hable con flores médico o enfermero, Puede brindarle ayuda. El nutricionista también puede ayudarle a planificar menús específicos para consumir la cantidad correcta de carbohidratos por día.  Para obtener más información, también puede conseguir un libro sobre el conteo de carbohidratos o visitar el sitio web de la Asociación [place] para la Diabetes (American Diabetes Association) (www.diabetes.org).  Todos los artículos se actualizan a medida que se descubre nueva evidencia y culmina nuestro proceso de evaluación por homólogos   Sheree artículo se recuperó de UpToDate el: Mar 27, 2024.  Artículo [id number] Versión [id number]  Release: 32.2.4 - C32.85  © 2024 [facility] Todos los derechos reservados.  figura 1: Contar los carbohidratos     Para determinar el conteo de carbohidratos netos en 1 porción, comience con el número de gramos de carbohidratos totales (46 gramos) y luego reste el número de gramos de fibra dietaria (7 gramos). También es importante observar el tamaño de la porción. En sheree ejemplo, el conteo de  carbohidratos es de 39 gramos. Puede usar sheree número a la hora de contar los carbohidratos para determinar flores dosis de insulina.  Gráco [id number] Versión 8.0  tabla 1: Panes y cereales con 15 gramos de carbohidratos*  Pan    Alimento  Tamaño de la porción    Bagel 1/4 de bagel ene (1 oz)   Galleta 1 galleta (2.5 pulgadas de diámetro)   Pan, reducido en calorías, dietético 2 rebanadas (1.5 oz)   Ackerman de harina maíz 1 cubo de 1.75 pulgadas (1.5 oz)   Panecillo inglés 1/2 panecillo   Pan para hot dog o hamburguesa 1/2 pan (3/4 de oz)   Naan, chapati o roti 1 oz   Panqueque 1 panqueque (4 pulgadas de diámetro, 1/4 de pulgada de grosor)   Giana (6 pulgadas de diámetro) 1/2 giana   Tortilla de harina de maíz 1 tortilla pequeña (6 pulgadas de diámetro)   Tortilla de harina de cecelia (juan antonio o integral) 1 tortilla pequeña (6 pulgadas de diámetro) o 1/3 de tortilla ene (10 pulgadas de diámetro)   Gofre 1 gofre (ruth de 4 pulgadas o 4 pulgadas de diámetro)   Cereales y granos (incluidas las pastas y el arroz)    Alimento  Tamaño de la porción (alimento cocido)    Cebada, cuscús, mijo, pasta (harina juan antonio o integral, todas las formas y tamaños), polenta, quinoa (todos los colores) o arroz (de anda, integral y otros colores y variedades) 1/3 de taza   Cereal de salvado (ramitas, bolitas o copos), almohadillas de cecelia (sabor natural) o cereal azucarado 1/2 taza   tayler Chua, tabule o arroz cecelia 1/2 taza   Granola 1/4 de taza   Cereal caliente (michael, cereal de michael, sémola) 1/2 taza   Cereal listo para consumir, sin endulzar 3/4 de taza   * En el genesis de los panes y cereales, 15 gramos de carbohidratos se consideran 1 porción o zoran opción para las personas que deben contar los carbohidratos.  Gráfico [id number] Versión 1.0  tabla 2: Frutas con 15 gramos de carbohidratos*  Alimento  Tamaño de la porción    Puré de manzana, sin endulzar 1/2 taza   Plátano 1 plátano (banana) muy pequeño, de alrededor de 4 pulgadas de  "katie (4 oz)   Arándanos azules 3/4 de taza   Frutas deshidratadas (arándanos azules, cerezas, arándanos rojos, frutas mixtas, uvas pasas) 2 cdas.   Fruta, enlatada 1/2 taza   Fruta, entera, pequeña (manzana) 1 fruta pequeña (4 oz)   Fruta, entera, mediana (nectarina, naranja, deshaun, mandarina) 1 fruta mediana (6 oz)   Jugo de fruta, sin endulzar 1/2 taza   Uvas 17 uvas pequeñas (3 oz)   Melón, en cubos 1 taza   Fresas, enteras 1 taza y 1/4   Donde se indica, el peso (onzas) incluye la cáscara o la piel y las semillas. Si tiene dudas acerca de si la fruta es del tamaño correcto para 1 porción, puede usar zoran balanza de cocina para rachel el peso de la fruta.  * En el genesis de las frutas, 15 gramos de carbohidratos se consideran 1 porción o zoran \"opción\" para las personas que deben contar los carbohidratos.  Gráfico [id number] Versión 1.0  tabla 3: Verduras con almidón con 15 gramos de carbohidratos*  Alimento  Tamaño de la porción (alimento cocido)    Yuca, pituca o plátano (macho) 1/3 de taza   Maíz, arvejas, verduras mixtas o chirivías 1/2 taza   Salsa marinara, salsa para pasta o cami para espagueti 1/2 taza   Verduras mixtas (con maíz o arvejas) 1 taza   Mario, al horno sin pelar 1/4 ene (3 onzas)   Mario, a la francesa (horneadas) 1 taza (2 onzas)   Mario, en puré con leche y grasa 1/2 taza   Zapallo (anco, calabaza) 1 taza   Ñame o batata, común 1/2 taza (3 1/2 onzas)   Si tiene dudas acerca de si la verdura es del tamaño correcto para 1 porción, puede usar zoran balanza de cocina para rachel el peso de la verdura.  * En el genesis de las verduras con almidón, 15 gramos de carbohidratos se consideran 1 porción o zoran \"opción\" para las personas que deben contar los carbohidratos.  Gráfico [id number] Versión 1.0  tabla 4: Ejemplo de plan de comidas del sistema de reemplazos  Hora  Patrón de reemplazos  Ejemplo de menú  Cantidad de carbohidratos (g)    8 am 3 grupos de carbohidratos    2 almidones 1 panecillo inglés 30    1 fruta 1 " "1/4 tazas de fresas 15    1 shira de proteínas 1/4 de taza de requesón -    1 shira de grasas 1 cdta. de margarina -      Total: 45    Mediodía 4 grupos de carbohidratos    2 almidones 2 rebanadas de pan 30    1 fruta 1 naranja 15    1 verdura 1 taza de ensalada -    1 lácteo 8 oz de leche descremada 12    3 grupos de proteínas 3 oz de bertha -    1 shira de grasas 1 cda. de mayonesa baja en grasa -      Total: 57    3 pm 1 shira de carbohidratos    1 fruta o 1 almidón 1 manzana o 6 galletas de sal 15      Total: 15    6 pm 4 grupos de carbohidratos    2 almidones 1 taza de rubens 30    1 fruta 1/2 taza de ensalada de frutas 15    1 verdura 1 taza de ensalada -    1 lácteo 8 oz de leche descremada 12    6 grupos de proteínas 6 oz de pescado -    1 shira de grasas 2 cdas. de aderezo para ensalada bajo en grasa -      Total: 57    9 pm 1 shira de carbohidratos    1 almidón 6 galletas de sal 15    1 proteína 2 cdas. de mantequilla de maní -      Total: 15    Gráfico [id number] Versión 3.0  figura 2: El \"método del plato\"     En el método del plato, comienza con un plato de alrededor de 9 pulgadas (23 cm) de diámetro. Luego, llena 1/2 plato con verduras sin almidón, 1/4 de plato con proteína y 1/4 de plato con carbohidratos.  Gráfico [id number] Versión 2.0  Exención de responsabilidad y uso de la información del consumidor   Descargo de responsabilidad: esta información generalizada es un resumen limitado de información sobre el diagnóstico, el tratamiento y/o los medicamentos. No pretende ser exhaustiva y se debe utilizar mainor herramienta para ayudar al usuario a comprender y/o evaluar las posibles opciones de diagnóstico y tratamiento. No incluye toda la información sobre afecciones, tratamientos, medicamentos, efectos secundarios o riesgos puedan ser aplicables a un paciente específico. No tiene el propósito de servir mainor recomendación médica ni de sustituir la recomendación médica, el diagnóstico o el tratamiento de un " profesional de atención médica que se base en el examen y la evaluación de sheree profesional de la jeovany respecto a las circunstancias específicas y únicas del paciente. Los pacientes deben hablar con un profesional de atención médica para obtener información completa sobre flores jeovany, cuestiones médicas y opciones de tratamiento, incluidos los riesgos o los beneficios relacionados con el uso de medicamentos. Esta información no certifica que los tratamientos o medicamentos aminah seguros, eficaces o estén aprobados para tratar a un paciente específico. [employer] y mylene afiliados renuncian a cualquier garantía o responsabilidad relacionada con esta información o el uso de la misma.El uso de esta información está sujeto a las Condiciones de uso, disponibles en https://www.[employer].com/en/know/clinical-effectiveness-terms. 2024© [employer] y mylene afiliados y/o licenciantes. Todos los derechos reservados.  Copyright   © 2024 [employer] Todos los derechos reservados.    Patient Education     Tratamiento de la diabetes tipo 2   Conceptos Básicos   Redactado por los médicos y editores de [employer]   ¿Cuáles son las metas del tratamiento de la diabetes tipo 2? -- Las metas del tratamiento de la diabetes tipo 2 son:   Manejar flores nivel de azúcar en janine   Prevenir futuros problemas de jeovany que pueden tener las personas con diabetes  ¿Cómo se trata la diabetes tipo 2? -- La diabetes tipo 2 se puede tratar con:   Cambios en la dieta   Cambios en el estilo de avel   Medicinas  Flores médico o enfermero trabajará con usted para crear el plan de tratamiento indicado.  ¿Qué cambios en la dieta y el estilo de avel podrían ser parte del tratamiento? -- Dionne parte del tratamiento, es posible que flores médico o enfermero le recomiende que:   Coma alimentos saludables   Baje de peso, si tiene exceso de peso corporal   Brayan bastante actividad física   No fume  Hacer estos cambios de estilo de avel es tan importante dionne usar las  "medicinas. También ayudarán a mejorar flores jeovany en general.  ¿Qué medicinas se usan para tratar la diabetes tipo 2? -- Para tratar la diabetes tipo 2 se usan distintas medicinas. La primera que usa la mayoría de la gente con diabetes tipo 2 es zoarn píldora llamada metformina (feliciano comercial: Glucophage).  ¿Cómo sé si mi tratamiento funciona? -- Flores médico puede realizar zoran prueba de janine llamada \"A1C\", la cual indica cuál fue flores nivel promedio de azúcar en janine en los últimos 2 a 3 meses.  Otra forma de saber si flores tratamiento funciona es revisar usted mismo flores nivel de azúcar en janine. Muchas personas con diabetes tipo 2 no necesitan hacerlo, mauricio algunas sí. Para esto generalmente se usa un dispositivo llamado \"monitor de glucosa en janine\". Si flores médico le recomienda que lo brijesh, le explicará cómo y cuándo usar el dispositivo.  ¿Qué pasa si mi nivel de azúcar en janine sigue siendo más alto de lo normal? -- Si flores nivel de azúcar en janine sigue siendo más alto de lo normal después de usar metformina duncan 2 a 3 meses, flores médico podría aumentar la dosis. Si ya está usando la dosis más guillermo posible, flores médico podría sugerir agregar zoran segunda medicina.  ¿Cuál es la segunda medicina que usaré? -- Existen distintas medicinas que flores médico le puede recetar. La segunda medicina podría ser otra píldora para bebeto todos los días o zoran inyección que usted mismo se aplica zoran vez por día o zoran vez por semana. La elección depende de distintos factores, mainor cuán elevado sea flores azúcar en janine, flores peso, mylene otros problemas de jeovany y si se siente cómodo aplicándose inyecciones.  Algunas de estas medicinas pueden tener el efecto secundario de hacer que el azúcar en janine baje. Los síntomas pueden incluir:   Sudor y temblores   Hambre   Sentimiento de preocupación  Los niveles bajos de azúcar en janine deben tratarse rápidamente porque pueden causar un desmayo. Flores médico o enfermero le dirá si flores medicina puede causar " niveles bajos de azúcar en janine y cómo tratarlos.  ¿Qué es la insulina? -- La insulina es zoran hormona normalmente producida en el páncreas, un órgano ubicado en el área del estómago. Ayuda a que el azúcar ingrese en las células del cuerpo. En la mayoría de las personas que tienen diabetes tipo 2, el cuerpo no responde a la insulina normalmente. Con el tiempo, el páncreas nevaeh de producir suficiente insulina.  La mayoría de las personas con diabetes tipo 2 pueden manejar flores azúcar en janine con zoran alimentación saludable, actividad física y medicinas. Algunas personas necesitan insulina mainor parte de flores plan de tratamiento.  La insulina podría ser la segunda o la única medicina que usen. Generalmente viene en zoran inyección que la persona se aplica a sí misma (figura 1).  Si flores médico le receta insulina, le dirá:   Qué tipo usar - Existen diferentes tipos de insulina. Algunos tipos actúan más rápido o tienen un efecto más duradero que otros.   Cuánto usar   Cuándo usarla   Cómo aplicarse la inyección   Cuándo revisar flores nivel de azúcar en janine   Cómo evitar los niveles bajos de azúcar en janine  Si usa insulina, flores dosis deberá cambiar si se enferma, se somete a zoran cirugía, viaja o come fuera de flores casa. Flores médico o enfermero hablará con usted sobre cuándo y cómo cambiar la dosis.  ¿Qué otros tratamientos es posible que necesite? -- Algunas veces, las personas con diabetes tipo 2 necesitan medicinas para tratar o prevenir otros problemas de jeovany. Por ejemplo, si tiene presión arterial guillermo podría usar medicina para bajarla, lo que puede reducir mylene posibilidades de tener un infarto o un accidente cerebrovascular (derrame).  ¿Cuándo markos consultar al médico o enfermero? -- La mayoría de las personas con diabetes consulta a flores médico o enfermero cada 3 o 4 meses. Vasyl estas consultas, el médico o enfermero hablará con usted sobre mylene medicinas y mylene niveles de azúcar en janine. Si mylene niveles de azúcar en  janine no son adecuados, es posible que el médico o enfermero brijesh cambios en flores plan de tratamiento.  Ocuparse de la diabetes puede ser difícil, y algunas personas sienten tristeza, estrés o ansiedad. Informe a flores médico o enfermero si tiene dificultades, para que pueda brindarle ayuda.  Todos los artículos se actualizan a medida que se descubre nueva evidencia y culmina nuestro proceso de evaluación por homólogos   Melissa artículo se recuperó de UpToDate el: Feb 26, 2024.  Artículo [id number] Versión [id number]  Release: 32.2.4 - C32.56  © 2024 UpToDate, Inc. Todos los derechos reservados.  figura 1: Cómo aplicar zoran inyección de insulina     Para aplicarse insulina usando zoran aguja y zoran jeringa:   Pellizque un poco de piel, e inserte rápidamente la aguja. Siga pellizcando la piel para evitar que la insulina entre en el músculo.  Empuje el émbolo completamente hasta el fondo.  Suelte la piel, y retire la aguja. Si ve janine o un líquido transparente (insulina) en el lugar donde se aplicó la inyección, presione la lalit duncan 5 a 8 segundos, mauricio no la frote.  Gráfico 16755 Versión 13.0  Exención de responsabilidad y uso de la información del consumidor   Descargo de responsabilidad: esta información generalizada es un resumen limitado de información sobre el diagnóstico, el tratamiento y/o los medicamentos. No pretende ser exhaustiva y se debe utilizar mainor herramienta para ayudar al usuario a comprender y/o evaluar las posibles opciones de diagnóstico y tratamiento. No incluye toda la información sobre afecciones, tratamientos, medicamentos, efectos secundarios o riesgos puedan ser aplicables a un paciente específico. No tiene el propósito de servir mainor recomendación médica ni de sustituir la recomendación médica, el diagnóstico o el tratamiento de un profesional de atención médica que se base en el examen y la evaluación de melissa profesional de la jeovany respecto a las circunstancias específicas y únicas del  "paciente. Los pacientes deben hablar con un profesional de atención médica para obtener información completa sobre flores jeovany, cuestiones médicas y opciones de tratamiento, incluidos los riesgos o los beneficios relacionados con el uso de medicamentos. Esta información no certifica que los tratamientos o medicamentos aminah seguros, eficaces o estén aprobados para tratar a un paciente específico. UpToDate, Inc. y mylene afiliados renuncian a cualquier garantía o responsabilidad relacionada con esta información o el uso de la misma.El uso de esta información está sujeto a las Condiciones de uso, disponibles en https://www.[employer].SnapYeti/en/know/clinical-effectiveness-terms. 2024© [employer]. y mylene afiliados y/o licenciantes. Todos los derechos reservados.  Copyright   © 2024 UpToDate, Inc. Todos los derechos reservados.    Patient Education     Cuidado de los pies para personas con diabetes   Conceptos Básicos   Redactado por los médicos y editores de UpToDate   ¿Por qué el cuidado de los pies es importante si tengo diabetes? -- La diabetes puede producir daño en los nervios si el nivel de azúcar en janine está alto duncan mucho tiempo. El término médico es \"neuropatía diabética\".  Si tiene problemas en los nervios en los pies, es posible que no pueda sentir dolor en el pie. Normalmente, las personas sienten dolor cuando tienen un jose o zoran ampolla en un pie. El dolor les indica que deben tratar el jose para que sane, mauricio las personas con daño en los nervios podrían no sentir dolor cuando se lastiman los pies. Incluso es posible que no sepan que tienen un jose, por lo que podrían dejarlo sin tratar. Los problemas que no se tratan de inmediato pueden empeorar mucho. Por ejemplo, un jose sin tratar se puede infectar y convertirse en zoran llaga abierta.  El nivel alto de azúcar en janine también puede dañar los vasos sanguíneos y disminuir el flujo sanguíneo a los pies. [place] puede debilitar la piel y hacer que las " heridas tarden más en sanar. También es más probable que se infecte si flores nivel de azúcar en janine es alto.  ¿Cómo cuido de mis pies? -- Cuidar hakeem de mylene pies puede ayudar a prevenir problemas en los pies. Debe hacer lo siguiente:   Lávese los pies todos los charis con agua tibia y jabón. Séquese los pies con golpecitos suaves, y asegúrese de secar la piel que está entre los dedos.   Mantenga los pies hidratados. Aplique loción en la parte de arriba y de abajo de los pies, mauricio no entre los dedos.   Revísese los pies todos los charis (figura 1). Jeane si tiene chiu, ampollas, enrojecimiento o inflamación. Use un hunter o pídale ayuda a alguien para revisar la parte inferior de los pies. Revise todas las partes del pie, especialmente entre los dedos. Jeane si tiene piel agrietada, úlceras, ampollas o enrojecimiento.   Córtese las uñas del pie rectas cuando sea necesario (figura 2). No jose las esquinas de las uñas de los pies. Lime los bordes ásperos. No se jose las cutículas. Pida ayuda si no ve hakeem o si no puede inclinarse lo suficiente para revisarse los pies.   Pídale al médico o enfermero que le revise los pies en cada consulta. Quítese los zapatos y los calcetines en esos exámenes.   Consulte a un especialista en cuidado de los pies (mainor un podólogo) si tiene zoran uña encarnada, un ilana de poe o un callo. No intente quitarse los ojos de poe y los callos usted mismo.  ¿Cómo protejo mis pies de lesiones? -- Existen varias maneras de protegerse los pies. Puede hacer lo siguiente:   Use zapatos y calcetines en todo momento, incluso dentro de flores casa. No camine descalzo. Póngase zapatos de baño si va a la playa o a zoran piscina.   Elija calzado que se ajuste hakeem a flores pie. No debe quedarle demasiado suelto ni demasiado apretado. El calzado debe tener espacio suficiente para los dedos (figura 3). Es posible que flores médico le recete zapatos especiales. [place] si flores seguro los cubre.   Revise mylene zapatos cada vez  antes de ponérselos para asegurarse de que la plantilla no esté doblada. Además, verifique que no haya nada dentro de los zapatos antes de ponérselos.   No use zapatos que dejen al descubierto ninguna parte del pie, mainor sandalias, chanclas o zuecos.   Use calcetines de algodón que no le queden demasiado apretados. No use calzado sin calcetines.   Proteja mylene pies del calor y del frío. Pruebe el agua del baño antes de meter los pies para asegurarse de que no esté demasiado caliente. No camine descalzo sobre suelo caliente. Tenga especial cuidado al salir cuando hace frío y use calcetines abrigados.  ¿Qué más markos saber? -- Puede disminuir el riesgo de tener problemas en los pies si mantiene flores nivel de azúcar en janine lo más cerca posible de mylene niveles objetivo. También puede hacer lo siguiente:   Mueva los tobillos y los dedos de los pies con frecuencia para ayudar con la circulación de la janine. Puede usar medias elásticas para que lo ayuden con la hinchazón.   Camine a menudo. Caminar regularmente ayuda con la circulación de la janine.   Si fuma, trate de dejar el hábito. Flores médico o enfermero puede ayudar. Fumar hace que la janine no circule tan hakeem a los pies y puede dañar mylene nervios.  ¿Cuándo markos llamar al médico? -- Llame a flores médico o enfermero para solicitar asesoramiento si tiene:   Fiebre de 100.4 °F (38 °C) o superior, escalofríos o zoran herida que no ashwin   Inflamación, enrojecimiento, calor en la lalit de zoran herida, olor desagradable que proviene de zoran herida o secreción amarillenta, verdosa o con janine   Llagas o ampollas en los pies que duelen más o menos de lo que cabría esperar   Adormecimiento u hormigueo en un pie o zoran pierna   Ojos de poe, callos, ampollas o llagas nuevas en el pie   Piel muy seca, escamosa o agrietada en los pies   Cambios en el aspecto de las articulaciones o del arco del pie  Todos los artículos se actualizan a medida que se descubre nueva evidencia y culmina  nuestro proceso de evaluación por homólogos   Melissa artículo se recuperó de UpToDate el: Mar 13, 2024.  Artículo [id number] Versión [id number]  Release: 32.2.4 - C32.71  © 2024 UpToDate, Inc. Todos los derechos reservados.  figura 1: Revisión de los pies en personas con diabetes     Las personas con diabetes deben revisarse ambos pies todos los charis. Es importante revisarse todo el pie, incluso entre los dedos. Si no puede verse la planta de los pies, use un hunter o pídale a otra persona que lo revise. Informe a flores médico o enfermero si encuentra:  Enrojecimiento   Paez o grietas en la piel   Ampollas   Inflamación   Gráfico [id number] Versión 3.0  figura 2: Córtese las uñas de los pies     Córtese las uñas de los pies rectas y aiden los bordes con zoran lima de uñas.  Gráfico 28462 Versión 2.0  figura 3: Forma correcta del calzado     Elija calzado que le calce hakeem y que no sea demasiado ajustado ni demasiado suelto. El calzado debe tener espacio suficiente para los dedos.  Gráfico 24486 Versión 2.0  Exención de responsabilidad y uso de la información del consumidor   Descargo de responsabilidad: esta información generalizada es un resumen limitado de información sobre el diagnóstico, el tratamiento y/o los medicamentos. No pretende ser exhaustiva y se debe utilizar mainor herramienta para ayudar al usuario a comprender y/o evaluar las posibles opciones de diagnóstico y tratamiento. No incluye toda la información sobre afecciones, tratamientos, medicamentos, efectos secundarios o riesgos puedan ser aplicables a un paciente específico. No tiene el propósito de servir mainor recomendación médica ni de sustituir la recomendación médica, el diagnóstico o el tratamiento de un profesional de atención médica que se base en el examen y la evaluación de melissa profesional de la jeovany respecto a las circunstancias específicas y únicas del paciente. Los pacientes deben hablar con un profesional de atención médica para obtener información  "completa sobre flores jeovany, cuestiones médicas y opciones de tratamiento, incluidos los riesgos o los beneficios relacionados con el uso de medicamentos. Esta información no certifica que los tratamientos o medicamentos aminah seguros, eficaces o estén aprobados para tratar a un paciente específico. UpToDate, Inc. y mylene afiliados renuncian a cualquier garantía o responsabilidad relacionada con esta información o el uso de la misma.El uso de esta información está sujeto a las Condiciones de uso, disponibles en https://www.Pans[employer].com/en/know/clinical-effectiveness-terms. 2024© UpToDate, Inc. y mylene afiliados y/o licenciantes. Todos los derechos reservados.  Copyright   © 2024 UpToDate, Inc. Todos los derechos reservados.    Patient Education     Nivel bajo de azúcar en janine en personas con diabetes   Conceptos Básicos   Redactado por los médicos y editores de UpToDate   ¿Qué es el nivel bajo de azúcar en janine? -- El nivel bajo de azúcar en ajnine es un padecimiento cuyos síntomas van desde sudoración y hambre hasta el desmayo. Se lo conoce también mainor “hipoglucemia” y se produce cuando el nivel de azúcar en janine disminuye demasiado.  [place] puede ocurrir en personas con diabetes (a veces llamada \"diabetes mellitus\") que usan ciertas medicinas para brian enfermedad, incluida la insulina y algunos tipos de píldoras.  ¿En qué ocasiones las personas con diabetes pueden tener bajo el nivel de azúcar en janine? -- Las personas con diabetes pueden tener bajo el nivel de azúcar en janine cuando:   Usan demasiada medicina, incluida la insulina o ciertas píldoras para la diabetes   No comen lo suficiente   Hacen demasiado ejercicio sin comer un refrigerio o reducir flores dosis de insulina   Esperan demasiado entre comidas   Destinee demasiado alcohol  ¿Cuáles son los síntomas del nivel bajo de azúcar en janine? -- Los síntomas del nivel bajo de azúcar en janine pueden ser distintos en cada persona y cambiar con el tiempo. Vasyl " las primeras etapas, zoran persona puede:   Sudar o temblar   Sentir hambre   Sentirse preocupada  Quienes tienen síntomas tempranos deben revisar flores nivel de azúcar en janine para rachel si es bajo y si necesitan tratamiento. Si los niveles bajos de azúcar en janine no se tratan, pueden ocasionar síntomas graves. Por ejemplo:   Dificultad para caminar o debilidad   Dificultad para rachel claramente   Confusión o comportamientos extraños   Desmayo o crisis neurológica  Algunas personas no sienten síntomas en las primeras etapas del nivel bajo de azúcar en janine, lo que los médicos llaman “hipoglucemia asintomática”. Las personas con hipoglucemia asintomática tienen más probabilidades de tener síntomas graves porque tony vez no saben que tienen bajo el nivel de azúcar en janine hasta que sufren síntomas graves. La hipoglucemia asintomática a menudo aparece en personas que:   Vides tenido diabetes tipo 1 duncan más de 5 a 10 años   Usan insulina para mantener flores nivel de azúcar en janine bajo control   Sufren cansancio   Bravo mucho alcohol   Usan ciertos medicamentos para la presión arterial guillermo o la diabetes  ¿Cómo se trata el nivel bajo de azúcar en janine? -- El nivel bajo de azúcar en janine se puede tratar con:   Polanco rápidas de azúcar - Las personas pueden tratar flores nivel bajo de azúcar en janine si bravo o comen polanco rápidas de azúcar (tabla 1). Los alimentos que tienen grasa, mainor el chocolate o el queso, no tratan el nivel bajo de azúcar en janine tan rápido. Usted y un miembro de flores gerda deben llevar consigo zoran teresa rápida de azúcar en todo momento.   Zoran dosis de glucagón - El glucagón es zoran hormona que puede subir rápidamente los niveles de azúcar en janine y detener los síntomas graves. Viene en forma de inyección (figura 1) o de spray nasal. Si flores médico le recomienda que lleve con usted glucagón, le dirá cuándo y cómo usarlo. Si es posible, también es buena idea que un familiar, amigo o persona  con la que vive aprenda a inyectarle glucagón, así brian persona podrá inyectárselo en genesis de que usted no pueda hacerlo por flores cuenta.  ¿Qué markos hacer después del tratamiento? -- Después del tratamiento para el nivel bajo de azúcar en janine, la mayoría de la gente puede volver a flores rutina habitual, mauricio flores médico o enfermero podría recomendarle que revise flores nivel de azúcar en janine con mayor frecuencia duncan los 2 a 3 días siguientes.  Si le cardoza tratado el nivel bajo de azúcar en janine con glucagón, llame a flores médico o enfermero. Es posible que le cambie la dosis de flores medicina para la diabetes.  ¿Cómo puedo prevenir el nivel bajo de azúcar en janine? -- La mejor forma de prevenirlo es:   Revisar a menudo mylene niveles de azúcar en janine - Flores médico o enfermero le dirá cómo y cuándo revisar mylene niveles en flores hogar. También le dirá cuáles deberían ser mylene niveles de azúcar en janine y cuándo tratar el nivel bajo de azúcar en janine.   Saber cuáles son los síntomas del nivel bajo de azúcar en janine y estar preparado para tratarlo en las primeras etapas, ya que si lo trata en forma temprana puede prevenir síntomas graves.  ¿Cuándo markos ir al hospital o pedir zoran ambulancia? -- Un familiar o amigo debe llevarlo al hospital o pida zoran ambulancia (en . [facility] [place] llame al 9-1-1) si:   Sigue confundido 15 minutos después de recibir tratamiento con zoran dosis de glucagón   Se ha desmayado y no tiene glucagón cerca   Sigue teniendo un nivel bajo de azúcar en janine después del tratamiento  Si tiene un nivel bajo de azúcar en janine no intente conducir hasta el hospital por mylene propios medios, ya que conducir con un nivel bajo de azúcar en janine puede ser peligroso.  Todos los artículos se actualizan a medida que se descubre nueva evidencia y culmina nuestro proceso de evaluación por homólogos   Melissa artículo se recuperó de UpToDate el: Apr 11, 2024.  Artículo [id number] Versión [id number].es-419.1  Release: 32.3.2 -  C32.100  © 2024 [facility]. Todos los derechos reservados.  tabla 1: Polanco rápidas de azúcar para tratar el nivel bajo de azúcar en janine  3 o 4 tabletas de glucosa   ½ taza de jugo o gaseosa normal (no del tipo sin azúcar)   2 cucharadas de uvas pasas   4 o 5 galletas de sal   1 cucharada de azúcar   1 cucharada de miel o jarabe de maíz   6 a 8 caramelos duros   Estas polanco de azúcar actúan rápidamente para tratar los niveles bajos de azúcar en janine. Las personas con diabetes que utilizan insulina o ciertas otras medicinas para la diabetes deben llevar consigo, en todo momento, al menos indio de estos elementos.  Gráfico [id number] Versión 4.0  figura 1: Autoinyector de glucagón     Algunas personas llevan consigo un autoinyector de glucagón con forma de bolígrafo que permite inyectar dosis de la medicina fácilmente en la parte superior del brazo, en el muslo o en el área del estómago.  Gráfico [id number] Versión 2.0  Exención de responsabilidad y uso de la información del consumidor   Descargo de responsabilidad: esta información generalizada es un resumen limitado de información sobre el diagnóstico, el tratamiento y/o los medicamentos. No pretende ser exhaustiva y se debe utilizar mainor herramienta para ayudar al usuario a comprender y/o evaluar las posibles opciones de diagnóstico y tratamiento. No incluye toda la información sobre afecciones, tratamientos, medicamentos, efectos secundarios o riesgos puedan ser aplicables a un paciente específico. No tiene el propósito de servir mainor recomendación médica ni de sustituir la recomendación médica, el diagnóstico o el tratamiento de un profesional de atención médica que se base en el examen y la evaluación de sheree profesional de la jeovany respecto a las circunstancias específicas y únicas del paciente. Los pacientes deben hablar con un profesional de atención médica para obtener información completa sobre flores jeovany, cuestiones médicas y opciones de tratamiento,  incluidos los riesgos o los beneficios relacionados con el uso de medicamentos. Esta información no certifica que los tratamientos o medicamentos aminah seguros, eficaces o estén aprobados para tratar a un paciente específico. [employer]. y mylene afiliados renuncian a cualquier garantía o responsabilidad relacionada con esta información o el uso de la misma.El uso de esta información está sujeto a las Condiciones de uso, disponibles en https://www.NullPointerer.com/en/know/clinical-effectiveness-terms. 2024© [employer] y mylene afiliados y/o licenciantes. Todos los derechos reservados.  Copyright   © 2024 UpToDate, Inc. Todos los derechos reservados.    Patient Education     Cómo hacer frente a la hipoglucemia por los medicamentos que consume   Acerca de sheree capri   A veces, los medicamentos que consume pueden hacer que flores nivel de azúcar en la janine baje demasiado, lo cual también se conoce mainor hipoglucemia. [place] también se conoce mainor efecto secundario. Cuando tiene hipoglucemia, puede sentirse sudoroso, tembloroso, cansado, hambriento o confundido. Algunas personas están nerviosas, tienen dolor de dany intenso, visión borrosa o latidos rápidos. Muchos tipos de medicamentos causan problemas de hipoglucemia. Es posible que tenga problemas de hipoglucemia si eber medicamentos para tratar las siguientes enfermedades/afecciones:  Infección  Problemas del corazón  Diabetes  Presión arterial guillermo  Estado de ánimo bajo  Si está tomando estos medicamentos, es posible que deba encontrar formas de prevenir o lidiar con el hecho de que flores nivel de azúcar en la janine esté demasiado bajo.  General   Algunos de estos medicamentos afectan la forma en que flores cuerpo utiliza el azúcar o la glucosa en la janine. Otros hacen que flores cuerpo libere más insulina, que es zoran hormona que reduce el azúcar en la janine. Algunos medicamentos pueden afectar la forma en que el cuerpo convierte las proteínas y las grasas en glucosa para que  el cuerpo las use. Si sarahi que tiene un nivel bajo de azúcar en janine, coma un bocadillo. Siga tomando flores medicamento y hable con flores médico. Pregunte si hay otro medicamento para tratar flores afección que no cause tanta hipoglucemia. Aquí se presentan otras formas de ayudar a lidiar con sheree problema:  Pregúntele al farmacéutico si debe bebeto zoran versión de liberación prolongada o sostenida de flores medicamento. Esta puede tener un efecto kaylene sobre el azúcar en la janine.  Evite el consumo de cerveza, vino y bebidas mixtas. El alcohol puede empeorar los problemas causados por la hipoglucemia.  Conozca los signos de la hipoglucemia.  Si tiene diabetes, aprenda a medirse el azúcar en la janine. Lleve un registro. Anote los resultados y cómo se sentía en mare momento. Brijesh notas sobre mylene actividades diarias, los medicamentos que eber y la comida que consume. Esta información puede ayudar al médico a tratarlo.  Tenga a la mano caramelos duros, tabletas de glucosa, glucosa líquida o jugos en genesis de hipoglucemia.  Realice comidas pequeñas y frecuentes en lo posible.  Brijesh comidas adicionales cuando brijesh ejercicio.  ¿Qué medicamentos pueden ser necesarios?   Es posible que el médico le cambie o suspenda indio de mylene medicamentos. Otras veces, el médico puede recetar medicamentos para lo siguiente:  Aliviar los signos de hipoglucemia  ¿Estará restringida la actividad física?   Es posible que deba limitar mylene actividades si siente mareos o vértigo a causa de la hipoglucemia.  ¿Qué problemas podrían surgir?   Caídas  Desmayos  Coma  ¿Cuándo markos llamar al médico?   La hipoglucemia vuelve a producirse  No puede subir la hipoglucemia  Sufre zoran convulsión  Se desmaya  Exención de responsabilidad y uso de la información del consumidor   Esta información general es un resumen limitado de la información sobre el diagnóstico, el tratamiento y/o la medicación. No pretende ser exhaustivo y debe utilizarse mainor zoran herramienta para ayudar  al usuario a comprender y/o evaluar las posibles opciones de diagnóstico y tratamiento. NO incluye toda la información sobre las enfermedades, los tratamientos, los medicamentos, los efectos secundarios o los riesgos que pueden aplicarse a un paciente específico. No tiene por objeto ser un consejo médico ni un sustituto del consejo médico. Tampoco pretende reemplazar al diagnóstico o el tratamiento proporcionados por un proveedor de atención médica con base en el examen y la evaluación por parte de sheree proveedor de las circunstancias específicas y únicas de un paciente. Los pacientes deben hablar con un proveedor de atención médica para obtener información completa sobre flores jeovany, preguntas médicas y opciones de tratamiento, incluidos los riesgos o beneficios relacionados con el uso de medicamentos. Esta información no respalda ningún tratamiento o medicamento mainor seguro, eficaz o aprobado para tratar a un paciente específico. [employer] y mylene afiliados renuncian a cualquier garantía o responsabilidad relacionada con esta información o con el uso que se brijesh de esta. El uso de esta información se rige por las Condiciones de uso, disponibles en [email or web address]://www.[employer].com/en/know/clinical-effectiveness-terms   Copyright   Copyright © 2024 [employer] y mylene licenciantes y/o afiliados. Todos los derechos reservados.

## 2025-05-13 NOTE — PROGRESS NOTES
Name: Jemal Diaz      : 1995      MRN: [id number]  Encounter Provider: Jane Lynn PA-C  Encounter Date: 2025   Encounter department: [facility] PRIMARY CARE [facility]  :  Assessment & Plan  Type 2 diabetes mellitus with hyperglycemia, without long-term current use of insulin (HCC)    Lab Results   Component Value Date    HGBA1C 12.9 (A) 2025     New diagnosis today, symptoms ongoing for about 2-3 years but never sought medical attention, suspect cause of frequent urination and weight loss. Significant hyperglycemia with fatigue. Demonstrated use of glucometer. Completed IRIS exam and diabetic foot exam, urine sent for annual microalbumin/cr ratio. Discussed risks of hyperglycemia and importance of glucose management. Discussed insulin today, will hold pending response to diet changes and oral medication. Will start GLP-1 with Trulicity once weekly pending insurance, demonstrated use of pen today. Caution with use of sulfonylurea and hypoglycemia. Start metformin 500mg BID and pending tolerance will increase to max dose. Maintain close follow-up if still with sugars >200s for dose adjustments in the next week but otherwise follow-up in 4 weeks for diabetes. Family hx of uncle with diabetes, no other known family members. Stop soda and sweets.    Orders:  •  POCT hemoglobin A1c  •  C-peptide; Future  •  Insulin, fasting; Future  •  metFORMIN (GLUCOPHAGE-XR) 500 mg 24 hr tablet; Take 1 tablet (500 mg total) by mouth 2 (two) times a day with meals Destinee 1 tableta dos veces al blanca con comida  •  glimepiride (AMARYL) 2 mg tablet; Take 1 tablet (2 mg total) by mouth daily with breakfast Destinee 1 tableta cada blanca con desayuno  •  Dulaglutide 0.75 MG/0.5ML SOAJ; Inject 0.75 mg under the skin once a week  •  atorvastatin (LIPITOR) 10 mg tablet; Take 1 tablet (10 mg total) by mouth daily Destinee 1 tableta antes de acostarse  •  Blood Glucose Monitoring Suppl (OneTouch Verio  Reflect) w/Device KIT; Check blood sugars once daily. Please substitute with appropriate alternative as covered by patient's insurance. Dx: E11.65  •  glucose blood (OneTouch Verio) test strip; Check blood sugars once daily. Please substitute with appropriate alternative as covered by patient's insurance. Dx: E11.65  •  OneTouch Delica Lancets 33G MISC; Check blood sugars once daily. Please substitute with appropriate alternative as covered by patient's insurance. Dx: E11.65  •  Albumin / creatinine urine ratio; Future  •  IRIS Diabetic eye exam    Glucosuria  With increased urinary frequency, recommend increase fluid intake with water and glucose control. Follow-up if no improvement with better glucose control.        Overweight (BMI 25.0-29.9)  Wt Readings from Last 3 Encounters:   05/13/25 86.5 kg (190 lb 9.6 oz)   04/15/25 85.7 kg (189 lb)   04/07/20 97.3 kg (214 lb 8.1 oz)     Stable weight since last visit. Encouraged well-balanced diet and exercise.        Hypertriglyceridemia  Lab Results   Component Value Date    CHOLESTEROL 125 05/06/2025     Lab Results   Component Value Date    HDL 33 (L) 05/06/2025     Lab Results   Component Value Date    TRIG 211 (H) 05/06/2025     Lab Results   Component Value Date    NONHDLC 92 05/06/2025     Start atorvastatin 10mg with goal to maintain LDL <70 for diabetes.    Lab Results   Component Value Date    LDLCALC 50 05/06/2025                   History of Present Illness   Jemal is a 29 y.o. male who presents after recently establishing care for lab review. He is not surprised about diabetes diagnosis. Mainly low energy and urinary frequency. Will try to cut back on sugar in coffee and soda.      2 toddler aged children present for exam.       Review of Systems   Constitutional:  Negative for fever and unexpected weight change.   Respiratory:  Negative for shortness of breath.    Cardiovascular:  Negative for chest pain.       Objective   /74 (BP Location: Left arm,  "Patient Position: Sitting, Cuff Size: Standard)   Pulse 85   Temp (!) 96.6 °F (35.9 °C) (Tympanic)   Resp 17   Ht 5' 8.5\" (1.74 m)   Wt 86.5 kg (190 lb 9.6 oz)   SpO2 98%   BMI 28.56 kg/m²      Physical Exam  Vitals reviewed.   Constitutional:       Appearance: Normal appearance.   HENT:      Head: Normocephalic and atraumatic.   Cardiovascular:      Rate and Rhythm: Normal rate and regular rhythm.      Pulses: no weak pulses.           Dorsalis pedis pulses are 2+ on the right side and 2+ on the left side.        Posterior tibial pulses are 2+ on the right side and 2+ on the left side.   Pulmonary:      Effort: Pulmonary effort is normal.      Breath sounds: Normal breath sounds.   Feet:      Right foot:      Skin integrity: No ulcer, skin breakdown, erythema, warmth, callus or dry skin.      Left foot:      Skin integrity: No ulcer, skin breakdown, erythema, warmth, callus or dry skin.   Neurological:      Mental Status: He is alert and oriented to person, place, and time. Mental status is at baseline.       Diabetic Foot Exam    Patient's shoes and socks removed.    Right Foot/Ankle   Right Foot Inspection  Skin Exam: skin normal and skin intact. No dry skin, no warmth, no callus, no erythema, no maceration, no abnormal color, no pre-ulcer, no ulcer and no callus.     Toe Exam: ROM and strength within normal limits.     Sensory   Monofilament testing: intact    Vascular  Capillary refills: < 3 seconds  The right DP pulse is 2+. The right PT pulse is 2+.     Left Foot/Ankle  Left Foot Inspection  Skin Exam: skin normal and skin intact. No dry skin, no warmth, no erythema, no maceration, normal color, no pre-ulcer, no ulcer and no callus.     Toe Exam: ROM and strength within normal limits.     Sensory   Monofilament testing: intact    Vascular  Capillary refills: < 3 seconds  The left DP pulse is 2+. The left PT pulse is 2+.     Assign Risk Category  No deformity present  No loss of protective sensation  No " weak pulses  Risk: 0

## 2025-05-14 ENCOUNTER — TELEPHONE (OUTPATIENT)
Age: 30
End: 2025-05-14

## 2025-05-14 NOTE — TELEPHONE ENCOUNTER
PA for Dulaglutide 0.75 MG/ SUBMITTED to     via    [x][facility]-KEY: [id number]  []Surescripts-Case ID #   []Availity-Auth ID # NDC #   []Faxed to plan   []Other website   []Phone call Case ID #     [x]PA sent as URGENT    All office notes, labs and other pertaining documents and studies sent. Clinical questions answered. Awaiting determination from insurance company.     Turnaround time for your insurance to make a decision on your Prior Authorization can take 7-21 business days.

## 2025-05-15 NOTE — TELEPHONE ENCOUNTER
PA for Dulaglutide 0.75 MG  APPROVED     Date(s) approved 05/14/2025-05/14/2026    Case #    Patient advised by          []Updoxhart Message  []Phone call   []LMOM  []L/M to call office as no active Communication consent on file  []Unable to leave detailed message as VM not approved on Communication consent       Pharmacy advised by    []Fax  []Phone call  []Secure Chat    Specialty Pharmacy    []     Approval letter scanned into Media No

## 2025-05-15 NOTE — TELEPHONE ENCOUNTER
Pt was called on approval of med       While on the phone pt mention that (OneTouch Verio Reflect) w/Device might not be working correctly or he not sure on how to used it. He would like a call or to pass by the office to have someone teach him how to use device and test strips

## 2025-05-15 NOTE — TELEPHONE ENCOUNTER
Ok for patient to come for nurse visit for demonstration of device - please call him to come by and bring it with him

## 2025-05-16 NOTE — TELEPHONE ENCOUNTER
Pt came to the office and I help him to set up the Glucose monitor and how to use injections. Pt understood.

## 2025-06-02 ENCOUNTER — OFFICE VISIT (OUTPATIENT)
Dept: FAMILY MEDICINE CLINIC | Facility: CLINIC | Age: 30
End: 2025-06-02
Payer: COMMERCIAL

## 2025-06-02 ENCOUNTER — APPOINTMENT (OUTPATIENT)
Dept: LAB | Facility: HOSPITAL | Age: 30
End: 2025-06-02
Payer: COMMERCIAL

## 2025-06-02 VITALS
WEIGHT: 188.8 LBS | RESPIRATION RATE: 16 BRPM | HEART RATE: 95 BPM | DIASTOLIC BLOOD PRESSURE: 62 MMHG | SYSTOLIC BLOOD PRESSURE: 90 MMHG | TEMPERATURE: 98.2 F | BODY MASS INDEX: 28.29 KG/M2 | OXYGEN SATURATION: 97 %

## 2025-06-02 DIAGNOSIS — E78.1 HYPERTRIGLYCERIDEMIA: ICD-10-CM

## 2025-06-02 DIAGNOSIS — E11.65 TYPE 2 DIABETES MELLITUS WITH HYPERGLYCEMIA, WITHOUT LONG-TERM CURRENT USE OF INSULIN (HCC): Primary | ICD-10-CM

## 2025-06-02 DIAGNOSIS — E66.3 OVERWEIGHT (BMI 25.0-29.9): ICD-10-CM

## 2025-06-02 DIAGNOSIS — E11.65 TYPE 2 DIABETES MELLITUS WITH HYPERGLYCEMIA, WITHOUT LONG-TERM CURRENT USE OF INSULIN (HCC): ICD-10-CM

## 2025-06-02 LAB — INSULIN SERPL-ACNC: 16.76 UIU/ML (ref 1.9–23)

## 2025-06-02 PROCEDURE — 84681 ASSAY OF C-PEPTIDE: CPT

## 2025-06-02 PROCEDURE — 99214 OFFICE O/P EST MOD 30 MIN: CPT | Performed by: PHYSICIAN ASSISTANT

## 2025-06-02 PROCEDURE — 36415 COLL VENOUS BLD VENIPUNCTURE: CPT

## 2025-06-02 PROCEDURE — 83525 ASSAY OF INSULIN: CPT

## 2025-06-02 RX ORDER — METFORMIN HYDROCHLORIDE 500 MG/1
500 TABLET, EXTENDED RELEASE ORAL
Qty: 180 TABLET | Refills: 1 | Status: SHIPPED | OUTPATIENT
Start: 2025-06-02

## 2025-06-02 NOTE — PROGRESS NOTES
Name: Jemal Bonilla      : 1995      MRN: [id number]  Encounter Provider: Jane Lynn PA-C  Encounter Date: 2025   Encounter department: [facility] PRIMARY CARE [facility]  :  Assessment & Plan  Type 2 diabetes mellitus with hyperglycemia, without long-term current use of insulin (HCC)    Lab Results   Component Value Date    HGBA1C 12.9 (A) 2025     Significant improvement with Trulicity and has been getting fasting and postprandial glucose levels the low 100s. Will discontinue glimepiride for risk of hypoglycemia and continue Metformin once daily for now as he feels like twice daily is difficult to tolerate. Plan to increase dose Trulicity pending A1c in 3 months. Maintain dietary changes and encouraged to continue compliance with medication.    Lab Results   Component Value Date    LDLCALC 50 2025     Orders:  •  Dulaglutide 0.75 MG/0.5ML SOAJ; Inject 0.75 mg under the skin once a week  •  metFORMIN (GLUCOPHAGE-XR) 500 mg 24 hr tablet; Take 1 tablet (500 mg total) by mouth daily with breakfast Destinee 1 tableta dos veces al blanca con comida    Overweight (BMI 25.0-29.9)  Wt Readings from Last 3 Encounters:   25 85.6 kg (188 lb 12.8 oz)   25 86.5 kg (190 lb 9.6 oz)   04/15/25 85.7 kg (189 lb)     Stable weight overall. Continue with lifestyle modifications.        Hypertriglyceridemia  Lab Results   Component Value Date    CHOLESTEROL 125 2025     Lab Results   Component Value Date    HDL 33 (L) 2025     Lab Results   Component Value Date    TRIG 211 (H) 2025     Lab Results   Component Value Date    NONHDLC 92 2025     Continue same dose atorvastatin 10mg, tolerating well.               History of Present Illness   Jemal is a 29 y.o. male who presents for follow-up after starting medications for diabetes. Significant improvement in symptoms, feels more energy, has been tracking sugars and much improved with starting medication. No  smoking or ETOH.     1 toddler aged daughter present for exam.       Review of Systems   Constitutional:  Negative for appetite change, fever and unexpected weight change.   Respiratory:  Negative for shortness of breath.    Cardiovascular:  Negative for chest pain.       Objective   BP 90/62 (BP Location: Left arm, Patient Position: Sitting, Cuff Size: Standard)   Pulse 95   Temp 98.2 °F (36.8 °C) (Tympanic)   Resp 16   Wt 85.6 kg (188 lb 12.8 oz)   SpO2 97%   BMI 28.29 kg/m²      Physical Exam  Vitals reviewed.   Constitutional:       Appearance: Normal appearance.   HENT:      Head: Normocephalic and atraumatic.     Cardiovascular:      Rate and Rhythm: Normal rate and regular rhythm.   Pulmonary:      Effort: Pulmonary effort is normal.      Breath sounds: Normal breath sounds.     Neurological:      Mental Status: He is alert and oriented to person, place, and time. Mental status is at baseline.

## 2025-06-02 NOTE — ASSESSMENT & PLAN NOTE
Lab Results   Component Value Date    CHOLESTEROL 125 05/06/2025     Lab Results   Component Value Date    HDL 33 (L) 05/06/2025     Lab Results   Component Value Date    TRIG 211 (H) 05/06/2025     Lab Results   Component Value Date    NONHDLC 92 05/06/2025     Continue same dose atorvastatin 10mg, tolerating well.

## 2025-06-02 NOTE — ASSESSMENT & PLAN NOTE
Wt Readings from Last 3 Encounters:   06/02/25 85.6 kg (188 lb 12.8 oz)   05/13/25 86.5 kg (190 lb 9.6 oz)   04/15/25 85.7 kg (189 lb)     Stable weight overall. Continue with lifestyle modifications.

## 2025-06-02 NOTE — ASSESSMENT & PLAN NOTE
Lab Results   Component Value Date    HGBA1C 12.9 (A) 05/13/2025     Significant improvement with Trulicity and has been getting fasting and postprandial glucose levels the low 100s. Will discontinue glimepiride for risk of hypoglycemia and continue Metformin once daily for now as he feels like twice daily is difficult to tolerate. Plan to increase dose Trulicity pending A1c in 3 months. Maintain dietary changes and encouraged to continue compliance with medication.    Lab Results   Component Value Date    LDLCALC 50 05/06/2025     Orders:  •  Dulaglutide 0.75 MG/0.5ML SOAJ; Inject 0.75 mg under the skin once a week  •  metFORMIN (GLUCOPHAGE-XR) 500 mg 24 hr tablet; Take 1 tablet (500 mg total) by mouth daily with breakfast Destinee 1 tableta dos veces al blanca con comida

## 2025-06-03 LAB — C PEPTIDE SERPL-MCNC: 3.7 NG/ML (ref 1.1–4.4)
